# Patient Record
Sex: MALE | Race: WHITE
[De-identification: names, ages, dates, MRNs, and addresses within clinical notes are randomized per-mention and may not be internally consistent; named-entity substitution may affect disease eponyms.]

---

## 2018-06-04 ENCOUNTER — HOSPITAL ENCOUNTER (INPATIENT)
Dept: HOSPITAL 47 - EC | Age: 83
LOS: 4 days | Discharge: HOME | DRG: 175 | End: 2018-06-08
Payer: MEDICARE

## 2018-06-04 VITALS — BODY MASS INDEX: 27.7 KG/M2

## 2018-06-04 DIAGNOSIS — J96.21: ICD-10-CM

## 2018-06-04 DIAGNOSIS — I26.99: Primary | ICD-10-CM

## 2018-06-04 DIAGNOSIS — I50.32: ICD-10-CM

## 2018-06-04 DIAGNOSIS — D72.819: ICD-10-CM

## 2018-06-04 DIAGNOSIS — Z79.899: ICD-10-CM

## 2018-06-04 DIAGNOSIS — E11.22: ICD-10-CM

## 2018-06-04 DIAGNOSIS — Z99.81: ICD-10-CM

## 2018-06-04 DIAGNOSIS — I13.0: ICD-10-CM

## 2018-06-04 DIAGNOSIS — I25.2: ICD-10-CM

## 2018-06-04 DIAGNOSIS — Z79.1: ICD-10-CM

## 2018-06-04 DIAGNOSIS — N17.9: ICD-10-CM

## 2018-06-04 DIAGNOSIS — J44.1: ICD-10-CM

## 2018-06-04 DIAGNOSIS — N18.3: ICD-10-CM

## 2018-06-04 DIAGNOSIS — D64.9: ICD-10-CM

## 2018-06-04 DIAGNOSIS — Z85.46: ICD-10-CM

## 2018-06-04 DIAGNOSIS — T50.2X5A: ICD-10-CM

## 2018-06-04 DIAGNOSIS — I27.20: ICD-10-CM

## 2018-06-04 DIAGNOSIS — Z92.3: ICD-10-CM

## 2018-06-04 DIAGNOSIS — E78.5: ICD-10-CM

## 2018-06-04 DIAGNOSIS — K21.9: ICD-10-CM

## 2018-06-04 DIAGNOSIS — I25.10: ICD-10-CM

## 2018-06-04 DIAGNOSIS — Z79.51: ICD-10-CM

## 2018-06-04 DIAGNOSIS — Z87.891: ICD-10-CM

## 2018-06-04 DIAGNOSIS — G62.82: ICD-10-CM

## 2018-06-04 DIAGNOSIS — Z66: ICD-10-CM

## 2018-06-04 DIAGNOSIS — Z79.84: ICD-10-CM

## 2018-06-04 LAB
ALBUMIN SERPL-MCNC: 3.5 G/DL (ref 3.5–5)
ALP SERPL-CCNC: 61 U/L (ref 38–126)
ALT SERPL-CCNC: 22 U/L (ref 21–72)
ANION GAP SERPL CALC-SCNC: 13 MMOL/L
APTT BLD: 26.7 SEC (ref 22–30)
AST SERPL-CCNC: 19 U/L (ref 17–59)
BASOPHILS # BLD MANUAL: 0.09 K/UL (ref 0–0.2)
BUN SERPL-SCNC: 59 MG/DL (ref 9–20)
CALCIUM SPEC-MCNC: 8.6 MG/DL (ref 8.4–10.2)
CELLS COUNTED: 100
CHLORIDE SERPL-SCNC: 97 MMOL/L (ref 98–107)
CK SERPL-CCNC: 46 U/L (ref 55–170)
CO2 SERPL-SCNC: 31 MMOL/L (ref 22–30)
D DIMER PPP FEU-MCNC: 2.2 MG/L FEU (ref ?–0.6)
EOSINOPHIL # BLD MANUAL: 0.09 K/UL (ref 0–0.7)
ERYTHROCYTE [DISTWIDTH] IN BLOOD BY AUTOMATED COUNT: 3.16 M/UL (ref 4.3–5.9)
ERYTHROCYTE [DISTWIDTH] IN BLOOD: 16.7 % (ref 11.5–15.5)
GLUCOSE BLD-MCNC: 190 MG/DL (ref 75–99)
GLUCOSE SERPL-MCNC: 157 MG/DL (ref 74–99)
HCT VFR BLD AUTO: 29.4 % (ref 39–53)
HGB BLD-MCNC: 9.6 GM/DL (ref 13–17.5)
INR PPP: 1.2 (ref ?–1.2)
LYMPHOCYTES # BLD MANUAL: 0.68 K/UL (ref 1–4.8)
MCH RBC QN AUTO: 30.4 PG (ref 25–35)
MCHC RBC AUTO-ENTMCNC: 32.7 G/DL (ref 31–37)
MCV RBC AUTO: 93 FL (ref 80–100)
MONOCYTES # BLD MANUAL: 0.5 K/UL (ref 0–1)
NEUTROPHILS NFR BLD MANUAL: 55 %
NEUTS SEG # BLD MANUAL: 1.7 K/UL (ref 1.3–7.7)
PH UR: 5 [PH] (ref 5–8)
PLATELET # BLD AUTO: 278 K/UL (ref 150–450)
POTASSIUM SERPL-SCNC: 3.8 MMOL/L (ref 3.5–5.1)
PROT SERPL-MCNC: 6 G/DL (ref 6.3–8.2)
PT BLD: 11.4 SEC (ref 9–12)
SODIUM SERPL-SCNC: 141 MMOL/L (ref 137–145)
SP GR UR: 1.01 (ref 1–1.03)
TROPONIN I SERPL-MCNC: <0.012 NG/ML (ref 0–0.03)
UROBILINOGEN UR QL STRIP: <2 MG/DL (ref ?–2)
WBC # BLD AUTO: 3.1 K/UL (ref 3.8–10.6)

## 2018-06-04 PROCEDURE — 80053 COMPREHEN METABOLIC PANEL: CPT

## 2018-06-04 PROCEDURE — 80198 ASSAY OF THEOPHYLLINE: CPT

## 2018-06-04 PROCEDURE — 99285 EMERGENCY DEPT VISIT HI MDM: CPT

## 2018-06-04 PROCEDURE — 83880 ASSAY OF NATRIURETIC PEPTIDE: CPT

## 2018-06-04 PROCEDURE — 85379 FIBRIN DEGRADATION QUANT: CPT

## 2018-06-04 PROCEDURE — 85610 PROTHROMBIN TIME: CPT

## 2018-06-04 PROCEDURE — 82550 ASSAY OF CK (CPK): CPT

## 2018-06-04 PROCEDURE — 87040 BLOOD CULTURE FOR BACTERIA: CPT

## 2018-06-04 PROCEDURE — 93970 EXTREMITY STUDY: CPT

## 2018-06-04 PROCEDURE — 36415 COLL VENOUS BLD VENIPUNCTURE: CPT

## 2018-06-04 PROCEDURE — 82553 CREATINE MB FRACTION: CPT

## 2018-06-04 PROCEDURE — 71045 X-RAY EXAM CHEST 1 VIEW: CPT

## 2018-06-04 PROCEDURE — 83036 HEMOGLOBIN GLYCOSYLATED A1C: CPT

## 2018-06-04 PROCEDURE — 81003 URINALYSIS AUTO W/O SCOPE: CPT

## 2018-06-04 PROCEDURE — 94640 AIRWAY INHALATION TREATMENT: CPT

## 2018-06-04 PROCEDURE — 71046 X-RAY EXAM CHEST 2 VIEWS: CPT

## 2018-06-04 PROCEDURE — 96365 THER/PROPH/DIAG IV INF INIT: CPT

## 2018-06-04 PROCEDURE — 83735 ASSAY OF MAGNESIUM: CPT

## 2018-06-04 PROCEDURE — 85730 THROMBOPLASTIN TIME PARTIAL: CPT

## 2018-06-04 PROCEDURE — 96376 TX/PRO/DX INJ SAME DRUG ADON: CPT

## 2018-06-04 PROCEDURE — 84484 ASSAY OF TROPONIN QUANT: CPT

## 2018-06-04 PROCEDURE — 93005 ELECTROCARDIOGRAM TRACING: CPT

## 2018-06-04 PROCEDURE — 80048 BASIC METABOLIC PNL TOTAL CA: CPT

## 2018-06-04 PROCEDURE — 78582 LUNG VENTILAT&PERFUS IMAGING: CPT

## 2018-06-04 PROCEDURE — 93306 TTE W/DOPPLER COMPLETE: CPT

## 2018-06-04 PROCEDURE — 96375 TX/PRO/DX INJ NEW DRUG ADDON: CPT

## 2018-06-04 PROCEDURE — 85025 COMPLETE CBC W/AUTO DIFF WBC: CPT

## 2018-06-04 RX ADMIN — NAPROXEN SCH MG: 250 TABLET ORAL at 21:04

## 2018-06-04 RX ADMIN — INSULIN ASPART SCH UNIT: 100 INJECTION, SOLUTION INTRAVENOUS; SUBCUTANEOUS at 21:07

## 2018-06-04 RX ADMIN — SODIUM CHLORIDE SCH MLS/HR: 450 INJECTION, SOLUTION INTRAVENOUS at 18:55

## 2018-06-04 RX ADMIN — FUROSEMIDE SCH MG: 10 INJECTION, SOLUTION INTRAMUSCULAR; INTRAVENOUS at 21:02

## 2018-06-04 RX ADMIN — GLIMEPIRIDE SCH MG: 2 TABLET ORAL at 21:03

## 2018-06-04 RX ADMIN — IPRATROPIUM BROMIDE AND ALBUTEROL SULFATE SCH: .5; 3 SOLUTION RESPIRATORY (INHALATION) at 21:06

## 2018-06-04 RX ADMIN — NITROGLYCERIN SCH: 20 OINTMENT TOPICAL at 21:07

## 2018-06-04 RX ADMIN — METHYLPREDNISOLONE SODIUM SUCCINATE SCH MG: 125 INJECTION, POWDER, FOR SOLUTION INTRAMUSCULAR; INTRAVENOUS at 21:06

## 2018-06-04 NOTE — ED
SOB HPI





- General


Chief Complaint: Shortness of Breath


Stated Complaint: CHF


Time Seen by Provider: 06/04/18 17:03


Source: patient


Mode of arrival: wheelchair


Limitations: physical limitation





- History of Present Illness


Initial Comments: 





This 84-year-old white male presents with a complaint of some shortness of 

breath.  He states that this is been present since August 2017 but seems to be 

worse over the last 2 months.  He does complain of some lower extremity edema 

and pain.  He relates that this is fairly chronic as well.  He denies any 

history of DVT or PE but does have a history of lower extremity 

atherosclerosis.  He also followed up some prostate cancer this past year and 

had 27 radiation treatments to his prostate.  He apparently has not been doing 

very well ever since.  The wife relates that he also has had some chills but no 

fevers.  He has had occasional shaking and jerking of his extremities.  He 

denies any actual chest pain.  He was seen at his primary care physician's 

office today and was sent to the ER for further evaluation, treatment, and 

admission.  He does wear home oxygen for chronic COPD.  He does have a history 

of congestive heart failure as well.  He denies any other pulmonary problems.  

No other complaints or modifying factors.  He apparently does have an 

appointment this next week to see Dr. Quan from pulmonology.  He does 

complain of significant weakness.





- Related Data


 Home Medications











 Medication  Instructions  Recorded  Confirmed


 


Atenolol [Tenormin] 50 mg PO DAILY 06/04/18 06/04/18


 


Atorvastatin [Lipitor] 20 mg PO DAILY 06/04/18 06/04/18


 


Fexofenadine HCl [Allegra Allergy] 180 mg PO DAILY 06/04/18 06/04/18


 


Fluticasone/Umeclidin/Vilanter 1 puff INHALATION RT-DAILY 06/04/18 06/04/18





[Trelegy Ellipta 100-62.5-25]   


 


Furosemide [Lasix] 20 mg PO DAILY 06/04/18 06/04/18


 


Glimepiride [Amaryl] 2 mg PO BID 06/04/18 06/04/18


 


Montelukast [Singulair] 10 mg PO DAILY 06/04/18 06/04/18


 


Naproxen 500 mg PO BID 06/04/18 06/04/18


 


Omeprazole 20 mg PO DAILY 06/04/18 06/04/18


 


Potassium Chloride ER [K-Dur 10] 10 meq PO DAILY 06/04/18 06/04/18


 


Tamsulosin [Flomax] 0.4 mg PO DAILY 06/04/18 06/04/18


 


Theophylline 24 Hour [Matt-24] 300 mg PO DAILY 06/04/18 06/04/18


 


Vit C/E/Zn/Coppr/Lutein/Zeaxan 1 cap PO DAILY 06/04/18 06/04/18





[Preservision Areds 2 Softgel]   


 


Vitamin B Complex 1 cap PO DAILY 06/04/18 06/04/18


 


sitaGLIPtin PHOS/metFORMIN HCL 1 tab PO DAILY 06/04/18 06/04/18





[Janumet Xr 100-1,000 mg Tablet]   











 Allergies











Allergy/AdvReac Type Severity Reaction Status Date / Time


 


No Known Allergies Allergy   Verified 06/04/18 17:18














Review of Systems


ROS Statement: 


Those systems with pertinent positive or pertinent negative responses have been 

documented in the HPI.





ROS Other: All systems not noted in ROS Statement are negative.





Past Medical History


Past Medical History: Heart Failure, COPD, Diabetes Mellitus, GERD/Reflux, 

Hyperlipidemia, Hypertension, Myocardial Infarction (MI)


Additional Past Medical History / Comment(s): prostate ca


History of Any Multi-Drug Resistant Organisms: None Reported


Past Surgical History: No Surgical Hx Reported


Past Psychological History: No Psychological Hx Reported


Smoking Status: Former smoker


Past Alcohol Use History: None Reported


Past Drug Use History: None Reported





General Exam





- General Exam Comments


Initial Comments: 





GENERAL: The patient is well nourished and well hydrated. 


VITAL SIGNS: Heart rate, blood pressure, respiratory rate reviewed as recorded 

in nurse's notes. 


EYES: Pupils are round and reactive. Extraocular movements are intact. No 

conjunctival / lid redness or swelling. 


ENT: No external evidence of injury, swelling, or ecchymosis. Airway is patent. 

Throat is clear. 


NECK: Nontender. No swelling or evidence of injury. No subcutaneous emphysema. 

Trachea is midline. No thyroid mass. 


HEART: Regular rate and rhythm. Good peripheral pulses. 


LUNGS/CHEST: Wheezing is noted to bilateral chest.  No ecchymosis, subcutaneous 

emphysema, or tenderness. 


ABDOMEN: Abdomen soft without tenderness. No palpable masses or organomegaly. 

No peritoneal signs. No abdominal wall swelling or ecchymosis. 


EXTREMITIES: There is bilateral leg tenderness and significant edema.  Normal 

muscle tone and function. No thoracolumbar tenderness. 


NEUROLOGIC: Sensation is grossly intact. Cranial nerve exam reveals face is 

symmetrical, tongue is midline, speech is clear. 


SKIN: No abrasions or ecchymosis is noted. No induration or masses noted. 


PSYCHIATRIC: Alert and oriented. Appropriate behavior and judgment.





Limitations: physical limitation





Course


 Vital Signs











  06/04/18 06/04/18 06/04/18





  16:51 17:54 18:05


 


Temperature 99.4 F  


 


Pulse Rate 78 80 76


 


Respiratory 22  





Rate   


 


Blood Pressure 145/63  


 


O2 Sat by Pulse 90 L  





Oximetry   














  06/04/18 06/04/18





  18:10 19:00


 


Temperature  


 


Pulse Rate 78 85


 


Respiratory 20 18





Rate  


 


Blood Pressure 145/68 118/59


 


O2 Sat by Pulse 98 93 L





Oximetry  














Medical Decision Making





- Medical Decision Making





The patient was seen and examined.  All diagnostics were reviewed.  The patient 

had a EKG done which shows a normal sinus rhythm at a rate of 72.  There is no 

acute ST-T wave changes noted.  The AR intervals 156, QRS duration is 90, and 

the QTC intervals 435.  An IV is established and he receives some nitroglycerin 

paste as well as an aspirin.  He receives 80 of Lasix intravenously as well as 

125 modems of Solu-Medrol.  The laboratory does show evidence of acute kidney 

injury with no previous labs to compare.  The patient also is anemic and does 

have a leukopenia.  There is elevation of the d-dimer.  The chest x-ray is 

reviewed.  This does show the possibility of pulmonary edema.  The lower 

extremity venous Doppler does not show any evidence of DVT.  The kidney 

function is elevated and it is not felt as though the patient could have a CTA 

performed due to this.  He is started on some heparin.  It is felt as though he 

does have a degree of congestive heart failure causing his symptomatology.  He 

may need a V/Q scan tomorrow.  The patient is admitted to the hospital for 

further treatment.  The case is discussed with internal medicine.





- Lab Data


Result diagrams: 


 06/04/18 17:27





 06/04/18 17:27


 Lab Results











  06/04/18 06/04/18 06/04/18 Range/Units





  17:27 17:27 17:27 


 


WBC   3.1 L   (3.8-10.6)  k/uL


 


RBC   3.16 L   (4.30-5.90)  m/uL


 


Hgb   9.6 L   (13.0-17.5)  gm/dL


 


Hct   29.4 L   (39.0-53.0)  %


 


MCV   93.0   (80.0-100.0)  fL


 


MCH   30.4   (25.0-35.0)  pg


 


MCHC   32.7   (31.0-37.0)  g/dL


 


RDW   16.7 H   (11.5-15.5)  %


 


Plt Count   278   (150-450)  k/uL


 


Neutrophils % (Manual)   55   %


 


Band Neutrophils %   1   %


 


Lymphocytes % (Manual)   22   %


 


Monocytes % (Manual)   16   %


 


Eosinophils % (Manual)   3   %


 


Basophils % (Manual)   3   %


 


Neutrophils # (Manual)   1.70   (1.3-7.7)  k/uL


 


Lymphocytes # (Manual)   0.68 L   (1.0-4.8)  k/uL


 


Monocytes # (Manual)   0.50   (0-1.0)  k/uL


 


Eosinophils # (Manual)   0.09   (0-0.7)  k/uL


 


Basophils # (Manual)   0.09   (0-0.2)  k/uL


 


Nucleated RBCs   0   (0-0)  /100 WBC


 


Poikilocytosis   Slight   


 


Poikilocytosis (manual   Present   


 


Anisocytosis   Slight   


 


PT     (9.0-12.0)  sec


 


INR     (<1.2)  


 


APTT     (22.0-30.0)  sec


 


D-Dimer     (<0.60)  mg/L FEU


 


Sodium    141  (137-145)  mmol/L


 


Potassium    3.8  (3.5-5.1)  mmol/L


 


Chloride    97 L  ()  mmol/L


 


Carbon Dioxide    31 H  (22-30)  mmol/L


 


Anion Gap    13  mmol/L


 


BUN    59 H  (9-20)  mg/dL


 


Creatinine    1.82 H  (0.66-1.25)  mg/dL


 


Est GFR (CKD-EPI)AfAm    39  (>60 ml/min/1.73 sqM)  


 


Est GFR (CKD-EPI)NonAf    33  (>60 ml/min/1.73 sqM)  


 


Glucose    157 H  (74-99)  mg/dL


 


Calcium    8.6  (8.4-10.2)  mg/dL


 


Total Bilirubin    0.7  (0.2-1.3)  mg/dL


 


AST    19  (17-59)  U/L


 


ALT    22  (21-72)  U/L


 


Alkaline Phosphatase    61  ()  U/L


 


Total Creatine Kinase  46 L    ()  U/L


 


CK-MB (CK-2)  1.0    (0.0-2.4)  ng/mL


 


CK-MB (CK-2) Rel Index  2.2    


 


Troponin I  <0.012    (0.000-0.034)  ng/mL


 


NT-Pro-B Natriuret Pep     pg/mL


 


Total Protein    6.0 L  (6.3-8.2)  g/dL


 


Albumin    3.5  (3.5-5.0)  g/dL


 


Urine Color     


 


Urine Appearance     (Clear)  


 


Urine pH     (5.0-8.0)  


 


Ur Specific Gravity     (1.001-1.035)  


 


Urine Protein     (Negative)  


 


Urine Glucose (UA)     (Negative)  


 


Urine Ketones     (Negative)  


 


Urine Blood     (Negative)  


 


Urine Nitrite     (Negative)  


 


Urine Bilirubin     (Negative)  


 


Urine Urobilinogen     (<2.0)  mg/dL


 


Ur Leukocyte Esterase     (Negative)  


 


Theophylline     ug/mL














  06/04/18 06/04/18 06/04/18 Range/Units





  17:27 17:27 17:27 


 


WBC     (3.8-10.6)  k/uL


 


RBC     (4.30-5.90)  m/uL


 


Hgb     (13.0-17.5)  gm/dL


 


Hct     (39.0-53.0)  %


 


MCV     (80.0-100.0)  fL


 


MCH     (25.0-35.0)  pg


 


MCHC     (31.0-37.0)  g/dL


 


RDW     (11.5-15.5)  %


 


Plt Count     (150-450)  k/uL


 


Neutrophils % (Manual)     %


 


Band Neutrophils %     %


 


Lymphocytes % (Manual)     %


 


Monocytes % (Manual)     %


 


Eosinophils % (Manual)     %


 


Basophils % (Manual)     %


 


Neutrophils # (Manual)     (1.3-7.7)  k/uL


 


Lymphocytes # (Manual)     (1.0-4.8)  k/uL


 


Monocytes # (Manual)     (0-1.0)  k/uL


 


Eosinophils # (Manual)     (0-0.7)  k/uL


 


Basophils # (Manual)     (0-0.2)  k/uL


 


Nucleated RBCs     (0-0)  /100 WBC


 


Poikilocytosis     


 


Poikilocytosis (manual     


 


Anisocytosis     


 


PT  11.4    (9.0-12.0)  sec


 


INR  1.2 H    (<1.2)  


 


APTT  26.7    (22.0-30.0)  sec


 


D-Dimer  2.20 H    (<0.60)  mg/L FEU


 


Sodium     (137-145)  mmol/L


 


Potassium     (3.5-5.1)  mmol/L


 


Chloride     ()  mmol/L


 


Carbon Dioxide     (22-30)  mmol/L


 


Anion Gap     mmol/L


 


BUN     (9-20)  mg/dL


 


Creatinine     (0.66-1.25)  mg/dL


 


Est GFR (CKD-EPI)AfAm     (>60 ml/min/1.73 sqM)  


 


Est GFR (CKD-EPI)NonAf     (>60 ml/min/1.73 sqM)  


 


Glucose     (74-99)  mg/dL


 


Calcium     (8.4-10.2)  mg/dL


 


Total Bilirubin     (0.2-1.3)  mg/dL


 


AST     (17-59)  U/L


 


ALT     (21-72)  U/L


 


Alkaline Phosphatase     ()  U/L


 


Total Creatine Kinase     ()  U/L


 


CK-MB (CK-2)     (0.0-2.4)  ng/mL


 


CK-MB (CK-2) Rel Index     


 


Troponin I     (0.000-0.034)  ng/mL


 


NT-Pro-B Natriuret Pep   1050   pg/mL


 


Total Protein     (6.3-8.2)  g/dL


 


Albumin     (3.5-5.0)  g/dL


 


Urine Color     


 


Urine Appearance     (Clear)  


 


Urine pH     (5.0-8.0)  


 


Ur Specific Gravity     (1.001-1.035)  


 


Urine Protein     (Negative)  


 


Urine Glucose (UA)     (Negative)  


 


Urine Ketones     (Negative)  


 


Urine Blood     (Negative)  


 


Urine Nitrite     (Negative)  


 


Urine Bilirubin     (Negative)  


 


Urine Urobilinogen     (<2.0)  mg/dL


 


Ur Leukocyte Esterase     (Negative)  


 


Theophylline    8.7  ug/mL














  06/04/18 Range/Units





  18:58 


 


WBC   (3.8-10.6)  k/uL


 


RBC   (4.30-5.90)  m/uL


 


Hgb   (13.0-17.5)  gm/dL


 


Hct   (39.0-53.0)  %


 


MCV   (80.0-100.0)  fL


 


MCH   (25.0-35.0)  pg


 


MCHC   (31.0-37.0)  g/dL


 


RDW   (11.5-15.5)  %


 


Plt Count   (150-450)  k/uL


 


Neutrophils % (Manual)   %


 


Band Neutrophils %   %


 


Lymphocytes % (Manual)   %


 


Monocytes % (Manual)   %


 


Eosinophils % (Manual)   %


 


Basophils % (Manual)   %


 


Neutrophils # (Manual)   (1.3-7.7)  k/uL


 


Lymphocytes # (Manual)   (1.0-4.8)  k/uL


 


Monocytes # (Manual)   (0-1.0)  k/uL


 


Eosinophils # (Manual)   (0-0.7)  k/uL


 


Basophils # (Manual)   (0-0.2)  k/uL


 


Nucleated RBCs   (0-0)  /100 WBC


 


Poikilocytosis   


 


Poikilocytosis (manual   


 


Anisocytosis   


 


PT   (9.0-12.0)  sec


 


INR   (<1.2)  


 


APTT   (22.0-30.0)  sec


 


D-Dimer   (<0.60)  mg/L FEU


 


Sodium   (137-145)  mmol/L


 


Potassium   (3.5-5.1)  mmol/L


 


Chloride   ()  mmol/L


 


Carbon Dioxide   (22-30)  mmol/L


 


Anion Gap   mmol/L


 


BUN   (9-20)  mg/dL


 


Creatinine   (0.66-1.25)  mg/dL


 


Est GFR (CKD-EPI)AfAm   (>60 ml/min/1.73 sqM)  


 


Est GFR (CKD-EPI)NonAf   (>60 ml/min/1.73 sqM)  


 


Glucose   (74-99)  mg/dL


 


Calcium   (8.4-10.2)  mg/dL


 


Total Bilirubin   (0.2-1.3)  mg/dL


 


AST   (17-59)  U/L


 


ALT   (21-72)  U/L


 


Alkaline Phosphatase   ()  U/L


 


Total Creatine Kinase   ()  U/L


 


CK-MB (CK-2)   (0.0-2.4)  ng/mL


 


CK-MB (CK-2) Rel Index   


 


Troponin I   (0.000-0.034)  ng/mL


 


NT-Pro-B Natriuret Pep   pg/mL


 


Total Protein   (6.3-8.2)  g/dL


 


Albumin   (3.5-5.0)  g/dL


 


Urine Color  Light Yellow  


 


Urine Appearance  Clear  (Clear)  


 


Urine pH  5.0  (5.0-8.0)  


 


Ur Specific Gravity  1.008  (1.001-1.035)  


 


Urine Protein  Negative  (Negative)  


 


Urine Glucose (UA)  Negative  (Negative)  


 


Urine Ketones  Negative  (Negative)  


 


Urine Blood  Negative  (Negative)  


 


Urine Nitrite  Negative  (Negative)  


 


Urine Bilirubin  Negative  (Negative)  


 


Urine Urobilinogen  <2.0  (<2.0)  mg/dL


 


Ur Leukocyte Esterase  Negative  (Negative)  


 


Theophylline   ug/mL














Disposition


Clinical Impression: 


 Dyspnea, Hypoxia, Leg pain, Anemia, Elevated d-dimer, Hypertension, NOEMI (acute 

kidney injury), CHF (congestive heart failure), Pulmonary edema





Disposition: ADMITTED AS IP TO THIS HOSP


Condition: Fair


Is patient prescribed a controlled substance at d/c from ED?: No


Time of Disposition: 19:35

## 2018-06-04 NOTE — XR
EXAMINATION: XR chest 2V

DATE AND TIME:  6/4/2018 6:12 PM

 

ORDERING PROVIDER: Fadi Huggins DO

 

CLINICAL INDICATION: difficulty breathing

 

TECHNIQUE: PA and lateral

 

COMPARISON: None.

 

DESCRIPTION: 

There is mild silhouetting of the pulmonary vasculature bilaterally likely fine reticular pattern of 
increased density, suggesting mild interstitial phase pulmonary edema.

 

There are a few scattered small bands of added opacities consistent with subsegmental atelectasis.

 

The pleural spaces are negative.

 

The cardiac silhouette is mildly enlarged. 

 

The mediastinal and pleural silhouettes are unremarkable. 

 

The skeletal structures are intact without focal findings. 

 

The soft tissues are unremarkable.

 

IMPRESSION:

Findings which can correlate with a clinical diagnosis of mild interstitial phase cardiogenic pulmona
ry edema.

## 2018-06-04 NOTE — US
EXAMINATION TYPE: US venous doppler duplex LE 

 

DATE OF EXAM: 6/4/2018 6:37 PM

 

COMPARISON: NONE

 

CLINICAL HISTORY: Pain. Swelling

 

SIDE PERFORMED: Bilateral  

 

TECHNIQUE:  The lower extremity deep venous system is examined utilizing real time linear array sonog
chris with graded compression, doppler sonography and color-flow sonography.

 

VESSELS IMAGED:

External Iliac Vein (EIV)

Common Femoral Vein

Deep Femoral Vein

Greater Saphenous Vein *

Femoral Vein

Popliteal Vein

Small Saphenous Vein *

Proximal Calf Veins

(* superficial vessels)

 

FINDINGS:  Grayscale, color doppler, spectral doppler imaging performed of the deep veins of the lowe
r extremities.  There is normal flow, compressibility, vascular waveforms.

 

IMPRESSION: NEGATIVE FOR DVT, BILATERAL LOWER EXTREMITIES.

## 2018-06-05 LAB
BASOPHILS # BLD AUTO: 0 K/UL (ref 0–0.2)
BASOPHILS NFR BLD AUTO: 1 %
EOSINOPHIL # BLD AUTO: 0 K/UL (ref 0–0.7)
EOSINOPHIL NFR BLD AUTO: 0 %
ERYTHROCYTE [DISTWIDTH] IN BLOOD BY AUTOMATED COUNT: 3.24 M/UL (ref 4.3–5.9)
ERYTHROCYTE [DISTWIDTH] IN BLOOD: 16.6 % (ref 11.5–15.5)
GLUCOSE BLD-MCNC: 307 MG/DL (ref 75–99)
GLUCOSE BLD-MCNC: 310 MG/DL (ref 75–99)
GLUCOSE BLD-MCNC: 336 MG/DL (ref 75–99)
GLUCOSE BLD-MCNC: 352 MG/DL (ref 75–99)
HBA1C MFR BLD: 6.9 % (ref 4–6)
HCT VFR BLD AUTO: 30.6 % (ref 39–53)
HGB BLD-MCNC: 9.9 GM/DL (ref 13–17.5)
LYMPHOCYTES # SPEC AUTO: 0.4 K/UL (ref 1–4.8)
LYMPHOCYTES NFR SPEC AUTO: 13 %
MCH RBC QN AUTO: 30.6 PG (ref 25–35)
MCHC RBC AUTO-ENTMCNC: 32.3 G/DL (ref 31–37)
MCV RBC AUTO: 94.5 FL (ref 80–100)
MONOCYTES # BLD AUTO: 0.1 K/UL (ref 0–1)
MONOCYTES NFR BLD AUTO: 2 %
NEUTROPHILS # BLD AUTO: 2.4 K/UL (ref 1.3–7.7)
NEUTROPHILS NFR BLD AUTO: 83 %
PLATELET # BLD AUTO: 270 K/UL (ref 150–450)
TROPONIN I SERPL-MCNC: 0.01 NG/ML (ref 0–0.03)
TROPONIN I SERPL-MCNC: <0.012 NG/ML (ref 0–0.03)
WBC # BLD AUTO: 2.9 K/UL (ref 3.8–10.6)

## 2018-06-05 RX ADMIN — POTASSIUM CHLORIDE SCH MEQ: 750 TABLET, EXTENDED RELEASE ORAL at 10:06

## 2018-06-05 RX ADMIN — ATENOLOL SCH MG: 50 TABLET ORAL at 10:06

## 2018-06-05 RX ADMIN — THEOPHYLLINE ANHYDROUS SCH MG: 300 CAPSULE, EXTENDED RELEASE ORAL at 10:06

## 2018-06-05 RX ADMIN — Medication SCH EACH: at 10:06

## 2018-06-05 RX ADMIN — NITROGLYCERIN SCH INCH: 20 OINTMENT TOPICAL at 09:59

## 2018-06-05 RX ADMIN — NITROGLYCERIN SCH INCH: 20 OINTMENT TOPICAL at 17:13

## 2018-06-05 RX ADMIN — ATORVASTATIN CALCIUM SCH MG: 20 TABLET, FILM COATED ORAL at 10:06

## 2018-06-05 RX ADMIN — NAPROXEN SCH MG: 250 TABLET ORAL at 21:25

## 2018-06-05 RX ADMIN — LORATADINE SCH MG: 10 TABLET ORAL at 10:06

## 2018-06-05 RX ADMIN — NAPROXEN SCH MG: 250 TABLET ORAL at 09:59

## 2018-06-05 RX ADMIN — IPRATROPIUM BROMIDE AND ALBUTEROL SULFATE SCH: .5; 3 SOLUTION RESPIRATORY (INHALATION) at 01:27

## 2018-06-05 RX ADMIN — TAMSULOSIN HYDROCHLORIDE SCH MG: 0.4 CAPSULE ORAL at 10:06

## 2018-06-05 RX ADMIN — METHYLPREDNISOLONE SODIUM SUCCINATE SCH MG: 125 INJECTION, POWDER, FOR SOLUTION INTRAMUSCULAR; INTRAVENOUS at 17:13

## 2018-06-05 RX ADMIN — PANTOPRAZOLE SODIUM SCH MG: 40 TABLET, DELAYED RELEASE ORAL at 06:32

## 2018-06-05 RX ADMIN — INSULIN ASPART SCH UNIT: 100 INJECTION, SOLUTION INTRAVENOUS; SUBCUTANEOUS at 21:36

## 2018-06-05 RX ADMIN — METHYLPREDNISOLONE SODIUM SUCCINATE SCH MG: 125 INJECTION, POWDER, FOR SOLUTION INTRAMUSCULAR; INTRAVENOUS at 23:41

## 2018-06-05 RX ADMIN — NITROGLYCERIN SCH: 20 OINTMENT TOPICAL at 21:25

## 2018-06-05 RX ADMIN — FUROSEMIDE SCH MG: 10 INJECTION, SOLUTION INTRAMUSCULAR; INTRAVENOUS at 21:24

## 2018-06-05 RX ADMIN — BUDESONIDE AND FORMOTEROL FUMARATE DIHYDRATE SCH PUFF: 160; 4.5 AEROSOL RESPIRATORY (INHALATION) at 20:15

## 2018-06-05 RX ADMIN — INSULIN DETEMIR SCH UNIT: 100 INJECTION, SOLUTION SUBCUTANEOUS at 13:14

## 2018-06-05 RX ADMIN — IPRATROPIUM BROMIDE AND ALBUTEROL SULFATE SCH ML: .5; 3 SOLUTION RESPIRATORY (INHALATION) at 16:04

## 2018-06-05 RX ADMIN — I-VITE, TAB 1000-60-2MG (60/BT) SCH EACH: TAB at 10:06

## 2018-06-05 RX ADMIN — INSULIN ASPART SCH UNIT: 100 INJECTION, SOLUTION INTRAVENOUS; SUBCUTANEOUS at 17:12

## 2018-06-05 RX ADMIN — ASPIRIN 325 MG ORAL TABLET SCH MG: 325 PILL ORAL at 10:06

## 2018-06-05 RX ADMIN — FUROSEMIDE SCH MG: 10 INJECTION, SOLUTION INTRAMUSCULAR; INTRAVENOUS at 09:59

## 2018-06-05 RX ADMIN — METHYLPREDNISOLONE SODIUM SUCCINATE SCH MG: 125 INJECTION, POWDER, FOR SOLUTION INTRAMUSCULAR; INTRAVENOUS at 09:58

## 2018-06-05 RX ADMIN — METHYLPREDNISOLONE SODIUM SUCCINATE SCH MG: 125 INJECTION, POWDER, FOR SOLUTION INTRAMUSCULAR; INTRAVENOUS at 13:14

## 2018-06-05 RX ADMIN — LINAGLIPTIN SCH MG: 5 TABLET, FILM COATED ORAL at 10:06

## 2018-06-05 RX ADMIN — IPRATROPIUM BROMIDE AND ALBUTEROL SULFATE SCH ML: .5; 3 SOLUTION RESPIRATORY (INHALATION) at 11:27

## 2018-06-05 RX ADMIN — MONTELUKAST SODIUM SCH MG: 10 TABLET, FILM COATED ORAL at 10:06

## 2018-06-05 RX ADMIN — SODIUM CHLORIDE SCH MLS/HR: 450 INJECTION, SOLUTION INTRAVENOUS at 23:46

## 2018-06-05 RX ADMIN — GLIMEPIRIDE SCH MG: 2 TABLET ORAL at 21:25

## 2018-06-05 RX ADMIN — GLIMEPIRIDE SCH MG: 2 TABLET ORAL at 10:06

## 2018-06-05 RX ADMIN — INSULIN ASPART SCH UNIT: 100 INJECTION, SOLUTION INTRAVENOUS; SUBCUTANEOUS at 12:07

## 2018-06-05 RX ADMIN — IPRATROPIUM BROMIDE AND ALBUTEROL SULFATE SCH ML: .5; 3 SOLUTION RESPIRATORY (INHALATION) at 20:15

## 2018-06-05 RX ADMIN — SODIUM CHLORIDE SCH MLS/HR: 450 INJECTION, SOLUTION INTRAVENOUS at 10:43

## 2018-06-05 RX ADMIN — NITROGLYCERIN SCH INCH: 20 OINTMENT TOPICAL at 13:14

## 2018-06-05 RX ADMIN — IPRATROPIUM BROMIDE AND ALBUTEROL SULFATE SCH ML: .5; 3 SOLUTION RESPIRATORY (INHALATION) at 07:35

## 2018-06-05 RX ADMIN — INSULIN ASPART SCH UNIT: 100 INJECTION, SOLUTION INTRAVENOUS; SUBCUTANEOUS at 06:33

## 2018-06-05 NOTE — CONS
CONSULTATION



REASON FOR CONSULTATION:

Shortness of breath, possible heart failure.



Mr. Echavarria is a gentleman who is 84 years of age.  He came into the hospital with

complaints of increasing shortness of breath that has been going on for about a year,

but over the last 2 months it was getting progressively worse.  He has some lower

extremity edema, some pain, but the edema seems to be chronic.  He has also other

comorbid conditions, including prostate cancer, for which he has had previous

treatments.  His main complaint was shortness of breath.  He does have home oxygen.  He

has got significant COPD, but apparently progressively it has gotten worse over the

last couple of months, more so in the last 2 weeks.  He also has history of

hypertension, hyperlipidemia, prostate cancer, type 2 diabetes, hyperlipidemia, and he

takes oral agents for his diabetes.



He does not have documented evidence of any CVA.  There is a question of myocardial

infarction.  He does not have any major surgeries that he can tell me about.



ALLERGIES:

NONE.



MEDICATIONS:

1. Metolazone.

2. Flomax.

3. Theophylline.

4. Singulair.

5. Lasix 20 mg daily.

6. Naproxen.

7. Tenormin 50 mg daily.

8. Glimepiride 2 mg b.i.d.

9. Atorvastatin 20 mg daily.

10.Janumet; dose is unclear.



PHYSICAL EXAMINATION:

Blood pressure is 148/80. Pulse rate is about 90 per minute, regular.

HEENT: Unremarkable. Fundus was not examined by me.

NECK:  Supple. There is JVD of at least 1 to 1.5 cm above the sternal angle.  There is

no carotid bruit.

Heart exam reveals S1, S2 heard normally.  There is a short systolic murmur at left

sternal border and base.  Second heart sound is well preserved.

Lungs reveal bilateral diminished air entry.

Abdomen is distended, nontender.

Lower extremities reveal bilateral mild edema with diminished pulses.

Central nervous system is grossly within normal limits.



EKG revealed a sinus mechanism with poor R-wave progression in leads V1 to V3 and

slightly leftward axis but no acute changes.



LABORATORY DATA:

BNP of 1050, which is technically normal for this patient.  His troponin levels are

normal.  Liver functions are within normal limits.  BUN and creatinine are elevated,

suggestive of chronic kidney disease secondary to diabetes.  D-dimer was elevated at

2.2.  Patient had a V/Q scan which revealed intermediate probability.  Dr. Maria has

seen him and advised him to continue heparin for the time being.



IMPRESSION:

1. Exacerbation of chronic obstructive pulmonary disease in a patient with known

    chronic oxygen-dependent chronic obstructive pulmonary disease.

2. I do not believe we are dealing with any systolic heart failure without any

    clinical evidence of heart failure and also normal BNP.

3. Probable significant pulmonary hypertension.

4. History of prostate cancer, for which he had radiation treatments.



RECOMMENDATIONS:

I would recommend that we obtain an echocardiogram to assess LV function, continue IV

heparin while the diagnosis of pulmonary embolism is still in flux.  His troponin

levels are normal, suggesting that there is no myocardial damage.  Based on clinical

findings, I will make further recommendations.  Appreciate Dr. Maria's input from a

pulmonary standpoint on this patient.  I do not believe we are dealing with pulmonary

embolism in a patient with a chronic lung disease.  Based on clinical course, I will

make further recommendations.



Thank you very much for the consult.





SUNIL / GABY: [id number] / Job#: [id number]

## 2018-06-05 NOTE — HP
HISTORY AND PHYSICAL



DATE OF SERVICE:

06/04/2018



CHIEF COMPLAINT:

Shortness of breath.



HISTORY OF PRESENT ILLNESS:

This 84 -year-old gentleman with a past medical history of CHF,  COPD,  diabetes type

2, GERD, hypertension, hyperlipidemia, being followed by Dr. Villela in the outpatient

setting was having shortness of breath and apparently  CHF in [place].  The patient

recently came to [place].  Patient admitted to [facility] a couple of times and

because of increased shortness of breath, the patient presented to Dr. Villela's office

today.  Dr. Villela discussed the case with me and sent the patient to the ER for

further evaluation and treatment.  There is no history of fever, rigors or chills.  No

history of headache, loss of consciousness, seizures.



PAST MEDICAL HISTORY:

1. COPD.

2. CHF.

3. Diabetes.

4. GERD.

5. Hypertension.

6. Hyperlipidemia.



MEDICATIONS:

Prior to admission include home medications are:

1. Vitamin B complex 1 p.o. daily.

2. Flomax 0.4 daily.

3. Vitamin 1 p.o. daily.

4. Matt-24 300 mg b.i.d.

5. Singular 10 mg p.o. daily.

6. K-Dur 10 mEq p.o. daily.

7. Lasix 20 mg b.i.d.

8. Allegra 180 mg p.o. daily.

9. Omeprazole 20 mg p.o. daily.

10.Naproxen 500 mg p.o. t.i.d.

11.Tenormin 50 mg p.o. daily.

12.Janumet 1 tab p.o. daily.

13.Amaryl 2 mg p.o. b.i.d.

14.Fluticasone Brilinta 1 puff daily.

15.Lipitor 20 mg p.o. daily.



ALLERGIES:

None.



FAMILY HISTORY:

No history of heart disease or strokes in the family.



SOCIAL HISTORY:

Previous history of smoking.  No history of current smoking or alcohol intake.



REVIEW OF SYSTEMS:

ENT: Diminished hearing and vision.

CARDIOVASCULAR: As mentioned earlier. Respiration:  Mentioned earlier.  GI no nausea or

vomiting. : No dysuria.  Nervous system: No numbness or weakness.

Allergy/Immunology: No asthma or hay fever.

MUSCULOSKELETAL: As mentioned earlier. Hematology/Oncology:  No history of anemia.

Endocrine: Diabetes mellitus.  Constitutional: As mentioned earlier.  Dermatology

negative.  Rheumatology negative.  Psychiatry as mentioned earlier.



PHYSICAL EXAMINATION:

Alert, oriented x3.  Pulse 92. Blood pressure 139/68, respiration 19, temperature 97.6,

pulse ox 94% on 3 L.

HEENT is conjunctivae normal.  Oral mucosa moist.

NECK:  Jugular venous distention at the root.  No carotid bruit.

CARDIOVASCULAR system:  S1, S2 muffled. No S3, no S4.

RESPIRATORY: Breath sounds diminished in the bases.  Bilateral scattered rhonchi and

crackles.

ABDOMEN:  Soft, nontender.  No mass.

LEGS:  Bilateral leg edema

NERVOUS SYSTEM: Higher functions as mentioned earlier.  Moves all 4 limbs.  No focal

motor deficits.

Lymphatics: No lymph nodes palpable in the neck, axillae or groin.

SKIN:  No ulcers, rashes or bleeding.



LAB STUDIES:

WBC 3.9, hemoglobin 9.6.  Otherwise, creatinine is 1.82.



ASSESSMENT:

1. Shortness of breath, possibly congestive heart failure acute exacerbation.

2. Chronic obstructive pulmonary disease acute exacerbation.

3. Increased creatinine with chronic kidney disease.

4. Elevated D-dimer.

5. History of diabetes type 2.

6. Gastroesophageal reflux disease.

7. Hypertension.

8. Hyperlipidemia.

9. Bilateral leg swelling.

10.History of myocardial infarction.

11.History of prostate cancer.

12.NO CODE, NO CPR, NO VENT.



RECOMMENDATIONS AND DISCUSSION:

In this 84-year-old gentleman who presented with multiple complex medical issues, we

will monitor the patient closely.  Continue the current medications.  Continue

symptomatic treatment.  Otherwise, we will initiate cautious diuresis and I would also

recommend IV heparin.  Cardiology consultation.  I would also recommend consultation

with pulmonary as well for COPD.  Otherwise repeat labs will be ordered.  Creatinine

will be monitored monitor close.  Home medications are reconciled.  The prognosis is

guarded because of multiple complex medical issues.  Further recommendations to follow.

A copy of dictation being forwarded to Dr. Villela who is the primary physician.





MMJUDY / BIRDN: [id number] / Job#: [id number]

## 2018-06-05 NOTE — ECHOF
Referral Reason:Heart Failure



MEASUREMENTS

--------

HEIGHT: 180.3 cm

WEIGHT: 84.8 kg

BP: 141/97

RVIDd:   3.4 cm     (< 3.3)

IVSd:   0.9 cm     (0.6 - 1.1)

LVIDd:   5.3 cm     (3.9 - 5.3)

LVPWd:   1.1 cm     (0.6 - 1.1)

IVSs:   1.5 cm

LVIDs:   2.7 cm

LVPWs:   2.1 cm

Ao Diam:   3.1 cm     (2.0 - 3.7)

LA Diam:   2.9 cm     (2.7 - 3.8)

MV EXCURSION:   16.009 mm     (> 18.000)

MV EF SLOPE:   76 mm/s     (70 - 150)

EPSS:   0.7 cm

MV E Arley:   1.10 m/s

MV DecT:   132 ms

MV A Arley:   1.43 m/s

MV E/A Ratio:   0.77 

RAP:   5.00 mmHg

RVSP:   54.59 mmHg







FINDINGS

--------

Sinus rhythm.

This was a technically difficult study with suboptimal views.

The left ventricular size is normal.   Left ventricular wall thickness is normal.   Overall left vent
ricular systolic function is normal with, an EF between 55 - 60 %.

The right ventricle is mildly enlarged.

The left atrium is normal in size.

The right atrium is normal in size.

The aortic valve was not well visualized.

The mitral valve leaflets are mildly thickened.   There is trace mitral regurgitation.

Mild tricuspid regurgitation present.   There is mild pulmonary hypertension.   The right ventricular
 systolic pressure, as measured by Doppler, is 54.59mmHg.

The pulmonic valve was not well visualized.   There is no pulmonic regurgitation present.

The aortic root, ascending aorta and aortic arch are normal.

Normal inferior vena cava with normal inspiratory collapse consistent with estimated right atrial pre
ssure of  5 mmHg.

There is no pericardial effusion.



CONCLUSIONS

--------

1. Sinus rhythm.

2. This was a technically difficult study with suboptimal views.

3. The left ventricular size is normal.

4. Left ventricular wall thickness is normal.

5. Overall left ventricular systolic function is normal with, an EF between 55 - 60 %.

6. The right ventricle is mildly enlarged.

7. The left atrium is normal in size.

8. The aortic valve was not well visualized.

9. The mitral valve leaflets are mildly thickened.

10. There is trace mitral regurgitation.

11. Mild tricuspid regurgitation present.

12. There is mild pulmonary hypertension.

13. There is no pulmonic regurgitation present.

14. The aortic root, ascending aorta and aortic arch are normal.

15. Normal inferior vena cava with normal inspiratory collapse consistent with estimated right atrial
 pressure of  5 mmHg.

16. There is no pericardial effusion.





SONOGRAPHER: Krystle Vee RDCS

## 2018-06-05 NOTE — P.CNPUL
History of Present Illness


Consult date: 06/05/18


Requesting physician: Amaury E Shahla


Reason for consult: dyspnea, COPD


Chief complaint: Shortness of breath, swelling of the lower extremities 


History of present illness: 





This is a very pleasant 84-year-old gentleman who follows with Dr. Villela as 

his primary care physician.  He has a history of hyperlipidemia, hypertension, 

coronary artery disease, congestive heart failure, diabetes mellitus, 

gastroesophageal reflux disease.  He also has a history of chronic obstructive 

pulmonary disease and was recently, in March 2018, started on home oxygen.  He 

was due to see Dr. Bridges in our office as a new patient this week.  He has been 

seen by a pulmonologist in [place] where he spends his lira.  He is on 

Trelegy, theophylline, Singulair, albuterol in the outpatient setting.  He is 

unsure of his pulmonary function numbers.  He does have a history of prostate 

cancer diagnosed in August 2017.  He had undergone 25 external radiation 

treatments and subsequently sent to [facility] for a 26 internal radiation with 

seed implants.  He states since that last treatment he has had lower extremity 

weakness and worsening shortness of breath.  He has been seen by a neurologist 

who states his lower extremity weakness is secondary to the radiation 

treatments.  Neuropathy.  Over the past several weeks he has also been having 

complaints of increasing swelling in the lower extremities and had been seen in 

[facility] treated with diuretics and released.  The swelling started 

again and he had been seen by Dr. Villela who at this time referred him here 

yesterday for further evaluation.  He is seen today in consultation on the 

selective care unit.  He is awake and alert in no acute distress.  He is 

dyspneic with minimal exertion and minimal conversation.  He states this is not 

new but has been getting progressively worse.  Troponins have been negative.  

ProBNP 1050.  Creatinine 1.82.  White count 2.9.  Hemoglobin 9.9.  D-dimer 

2.20.  Doppler studies of the lower extremities were negative for DVT 

bilaterally.  VQ scan revealed a moderate-sized effusion defect along the 

lateral right lung and a right basilar defect.  Read as intermediate 

probability for pulmonary embolism.  The patient is currently on a heparin 

drip.  He is also receiving Lasix 40 mg IV push every 12 hours.  He is 

currently maintaining good O2 saturations in the 90s on 3 L/m per nasal 

cannula.  He's been afebrile.  Hemodynamically stable.  Initiated on 

bronchodilators and IV Solu-Medrol. 





Review of Systems


Constitutional: Reports fatigue, Reports lethargy, Reports weight gain


Eyes: denies blurred vision, denies decreased vision


Ears: bilateral: decreased hearing


Ears, nose, mouth and throat: Denies headache, Denies sore throat


Cardiovascular: Reports dyspnea on exertion, Reports leg edema, Reports 

shortness of breath


Respiratory: Reports cough, Reports dyspnea, Reports home oxygen, Reports 

wheezing


Gastrointestinal: Denies abdominal pain, Denies diarrhea, Denies nausea, Denies 

vomiting


Genitourinary: Reports urinary hesitancy


Musculoskeletal: Reports muscle weakness, Reports shooting leg pain


Musculoskeletal: bilateral: ankle swelling


Integumentary: Denies pruritus, Denies rash


Neurological: Reports numbness, Reports weakness


Psychiatric: Denies anxiety, Denies depression


Endocrine: Denies fatigue, Denies weight change


Hematologic/Lymphatic: Reports as per HPI


Allergic/Immunologic: Reports as per HPI





Past Medical History


Past Medical History: Heart Failure, COPD, Diabetes Mellitus, GERD/Reflux, 

Hyperlipidemia, Hypertension, Myocardial Infarction (MI)


Additional Past Medical History / Comment(s): prostate ca


Last Myocardial Infarction Date:: unknown


History of Any Multi-Drug Resistant Organisms: None Reported


Past Surgical History: No Surgical Hx Reported, Orthopedic Surgery


Additional Past Surgical History / Comment(s): right rotator cuff sugery


Past Anesthesia/Blood Transfusion Reactions: No Reported Reaction


Past Psychological History: No Psychological Hx Reported


Smoking Status: Former smoker


Past Alcohol Use History: None Reported


Past Drug Use History: None Reported





Medications and Allergies


 Home Medications











 Medication  Instructions  Recorded  Confirmed  Type


 


Atenolol [Tenormin] 50 mg PO DAILY 06/04/18 06/04/18 History


 


Atorvastatin [Lipitor] 20 mg PO DAILY 06/04/18 06/04/18 History


 


Fexofenadine HCl [Allegra Allergy] 180 mg PO DAILY 06/04/18 06/04/18 History


 


Fluticasone/Umeclidin/Vilanter 1 puff INHALATION RT-DAILY 06/04/18 06/04/18 

History





[Trelegy Ellipta 100-62.5-25]    


 


Furosemide [Lasix] 20 mg PO DAILY 06/04/18 06/04/18 History


 


Glimepiride [Amaryl] 2 mg PO BID 06/04/18 06/04/18 History


 


Montelukast [Singulair] 10 mg PO DAILY 06/04/18 06/04/18 History


 


Naproxen 500 mg PO BID 06/04/18 06/04/18 History


 


Omeprazole 20 mg PO DAILY 06/04/18 06/04/18 History


 


Potassium Chloride ER [K-Dur 10] 10 meq PO DAILY 06/04/18 06/04/18 History


 


Tamsulosin [Flomax] 0.4 mg PO DAILY 06/04/18 06/04/18 History


 


Theophylline 24 Hour [Matt-24] 300 mg PO DAILY 06/04/18 06/04/18 History


 


Vit C/E/Zn/Coppr/Lutein/Zeaxan 1 cap PO DAILY 06/04/18 06/04/18 History





[Preservision Areds 2 Softgel]    


 


Vitamin B Complex 1 cap PO DAILY 06/04/18 06/04/18 History


 


sitaGLIPtin PHOS/metFORMIN HCL 1 tab PO DAILY 06/04/18 06/04/18 History





[Janumet Xr 100-1,000 mg Tablet]    


 


Cetirizine HCl [Zyrtec] 10 mg PO DAILY 06/05/18 06/05/18 History


 


Metolazone [Zaroxolyn] 5 mg PO DAILY 06/05/18 06/05/18 History











 Allergies











Allergy/AdvReac Type Severity Reaction Status Date / Time


 


No Known Allergies Allergy   Verified 06/04/18 17:18














Physical Exam


Vitals: 


 Vital Signs











  Temp Pulse Pulse Resp BP BP Pulse Ox


 


 06/05/18 07:50  98 F   96  18   135/67  94 L


 


 06/05/18 07:45   92     


 


 06/05/18 07:35   92     


 


 06/05/18 03:46    96  20   


 


 06/05/18 03:37  97.0 F L   96  20   141/97  94 L


 


 06/04/18 23:33    92  19   


 


 06/04/18 23:31  97.6 F   92  19   139/69  94 L


 


 06/04/18 20:15  98.3 F  92   18  135/58   93 L


 


 06/04/18 19:58  97.7 F   95  22   152/70  91 L


 


 06/04/18 19:00   85   18  118/59   93 L


 


 06/04/18 18:10   78   20  145/68   98


 


 06/04/18 18:05   76     


 


 06/04/18 17:54   80     


 


 06/04/18 16:51  99.4 F  78   22  145/63   90 L








 Intake and Output











 06/04/18 06/05/18 06/05/18





 22:59 06:59 14:59


 


Intake Total 162 159.362 


 


Output Total 200 1525 


 


Balance -38 -1365.638 


 


Intake:   


 


  Intake, IV Titration 62 159.362 





  Amount   


 


    Heparin Sodium,Porcine/ 62 159.362 





    D5w Pmx 25,000 unit In   





    Dextrose/Water 1 500ml.   





    bag @ 18 UNITS/KG/HR 31.   





    35 mls/hr IV .[id number] [facility]   





    Rx#:[id number]   


 


  Oral 100  


 


Output:   


 


  Urine 200 1525 


 


Other:   


 


  Voiding Method  Urinal 


 


  # Voids  1 


 


  Weight 85.2 kg  














- Constitutional


General appearance: average body habitus, mild distress





- EENT


Eyes: EOMI, PERRLA


ENT: hard of hearing


Ears: bilateral: normal





- Neck


Neck: normal ROM


Carotids: bilateral: upstroke normal


Thyroid: bilateral: normal size





- Respiratory


Respiratory: bilateral: diminished, wheezing





- Cardiovascular


Rhythm: regular


Heart sounds: normal: S1, S2





- Gastrointestinal


General gastrointestinal: normal bowel sounds, no organomegaly, soft, no 

tenderness





- Neurologic


Neurologic: CNII-XII intact





- Musculoskeletal


Musculoskeletal: generalized weakness





- Psychiatric


Psychiatric: A&O x's 3, appropriate affect, intact judgment & insight





Results





- Laboratory Findings


CBC and BMP: 


 06/05/18 05:40





 06/04/18 17:27


PT/INR, D-dimer











PT  11.4 sec (9.0-12.0)   06/04/18  17:27    


 


INR  1.2  (<1.2)  H  06/04/18  17:27    


 


D-Dimer  2.20 mg/L FEU (<0.60)  H  06/04/18  17:27    








Abnormal lab findings: 


 Abnormal Labs











  06/04/18 06/04/18 06/04/18





  17:27 17:27 17:27


 


WBC   3.1 L 


 


RBC   3.16 L 


 


Hgb   9.6 L 


 


Hct   29.4 L 


 


RDW   16.7 H 


 


Lymphocytes #   


 


Lymphocytes # (Manual)   0.68 L 


 


INR   


 


APTT   


 


D-Dimer   


 


Chloride    97 L


 


Carbon Dioxide    31 H


 


BUN    59 H


 


Creatinine    1.82 H


 


Glucose    157 H


 


POC Glucose (mg/dL)   


 


Total Creatine Kinase  46 L  


 


Total Protein    6.0 L














  06/04/18 06/04/18 06/04/18





  17:27 20:32 23:56


 


WBC   


 


RBC   


 


Hgb   


 


Hct   


 


RDW   


 


Lymphocytes #   


 


Lymphocytes # (Manual)   


 


INR  1.2 H  


 


APTT    60.1 H


 


D-Dimer  2.20 H  


 


Chloride   


 


Carbon Dioxide   


 


BUN   


 


Creatinine   


 


Glucose   


 


POC Glucose (mg/dL)   190 H 


 


Total Creatine Kinase   


 


Total Protein   














  06/05/18 06/05/18





  05:40 06:01


 


WBC  2.9 L 


 


RBC  3.24 L 


 


Hgb  9.9 L 


 


Hct  30.6 L 


 


RDW  16.6 H 


 


Lymphocytes #  0.4 L 


 


Lymphocytes # (Manual)  


 


INR  


 


APTT  


 


D-Dimer  


 


Chloride  


 


Carbon Dioxide  


 


BUN  


 


Creatinine  


 


Glucose  


 


POC Glucose (mg/dL)   310 H


 


Total Creatine Kinase  


 


Total Protein  














- Diagnostic Findings


Chest x-ray: image reviewed





Assessment and Plan


Assessment: 





Impression:





#1 Acute on chronic hypoxic respiratory failure secondary to suspected 

pulmonary embolism as well as acute exacerbation of chronic obstructive 

pulmonary disease and possible acute on chronic systolic congestive heart 

failure.  Echocardiogram pending.





#2 Oxygen dependent chronic obstructive pulmonary disease.





#3 History of chronic tobacco dependence.





#4 History of prostate cancer status post radiation with lower extremity 

neuropathy.





#5 Lower extremity edema and suspect secondary to congestive heart failure.





#6 History of coronary artery disease.





#7 Diabetes mellitus.





#8 Gastroesophageal reflux disease.





#9 Hyperlipidemia.





#10 Acute renal failure secondary to diuretics.





Plan:





The patient was seen and evaluated by Dr. Maria.  Chest x-ray, VQ scan and 

labs were all reviewed.  We'll go ahead and continue with the heparin drip for 

now.  Intermediate probability of PE.  The patient does have a history of 

recent progressive prostate cancer.  We'll also treat him for COPD 

exacerbation.  Continue bronchodilators, Symbicort and IV Solu-Medrol.  He was 

due to be seen in our office as a new patient.  He does have a pulmonologist in 

[place].  He is oxygen dependent.  We will continue to follow and make further 

recommendations based on his clinical status.





I, the cosigning physician, performed a history & physical examination of the 

patient. Lungs sounds have bilateral end expiratory wheeze, diminished.  

Maintaining good O2 saturations in the 90s on 3 L/m per nasal cannula.  I 

discussed the assessment and plan of care with my nurse practitioner, Kalli Medel. I attest to the above note as dictated by her.


Time with Patient: Greater than 30

## 2018-06-05 NOTE — CDI
Documentation Clarification Form





Date:  6/5/2018

CDS: Mandi Garg, CCS, CCDS

Phone: [phone]

Admit Date: 6/4/2018   

Account #: [id number]

Patient Name: Lawrence Echavarria

   

ATTENTION: The Clinical Documentation Specialists (CDI) and [facility] Coding Staff 
appreciate your assistance in clarifying documentation. Please respond to the 
clarification below the line at the bottom and electronically sign. The CDI & 
[facility] Coding staff will review the response and follow-up if needed. Please note: 
Queries are made part of the Legal Health Record. If you have any questions, 
please contact the author of this message via ITS.



Dr. Raghav Silva:



Patient presented to  with increased SOB. Diagnosed with Acute Exac COPD, 
Acute on Chronic Systolic CHF, Acute on Chronic Hypoxic Respiratory Failure, 
possible PE and Acute Kidney Failure.

Per the History & Physicial: Increased creatinine with chronic kidney disease.



History: Hypertension, CHF, COPD, DM II, GERD, on home O2.



Clinical Indicators:

LAB: BUN 59,  Creatinine 1.82,  GFR 33,  Glucose 157

Baseline is not known or documented.



Treatment: IV Lasix, IV Solumedrol, IV Heparin, Albuterol INH, O2 3Lnc. No IV 
fluids given d/t CHF, chronic pulmonary edema & possible PE.

Home meds: Zaroxolyn, K Dur, Lasix, Tenormin, Metformin, Amaryl



In order to capture the severity of condition, please clarify if the condition 
signifies:



    CKD Stage 1 (GFR > 90)

    CKD Stage 2 (GFR 60-89)

    CKD Stage 3 (GFR 30-59)

    CKD Stage 4 (GFR 15-29)

    CKD Stage 5 (GFR <15)

    Other, please specify

    Unable to determine



Please continue to document in your progress notes and discharge summary in 
order to capture severity of illness and risk of mortality. Include clinical 
findings that support your diagnosis.

________________________________________________________________________________
_____



CKD Stage 3 (GFR 30-59)
MTDD

## 2018-06-05 NOTE — NM
EXAMINATION TYPE: NM pul vent and perfuse

 

DATE OF EXAM: 6/5/2018

 

COMPARISON: NONE

 

HISTORY: Elevated d-dimer, short of breath lower extremity swelling

 

TECHNIQUE:  Utilizing inhalation of 39.5 mCi Tc 99m DTPA aerosol and intravenous injection of 5.35 mC
i of Tc 99m MAA, ventilation and perfusion images are acquired post injection in multiple projections
.

 

FINDINGS: 

There is a moderate-sized fusion defect along the lateral right lung. On the oblique view there appea
rs to be right basilar defect larger on perfusion. Large mismatched defects are not evident. There is
 a somewhat patchy distribution on the ventilation portion of the study. 

 

Small triple match defect at the right base may be present. This would place the probability for pulm
onary embolism into the low end of the intermediate range, 22%. With the additional defects above thi
s raises the probability further into the intermediate range, between 20% and 80%.

 

IMPRESSION: 

Intermediate probability for pulmonary embolism based on PIOPED 2 criteria.

## 2018-06-06 LAB
ANION GAP SERPL CALC-SCNC: 19 MMOL/L
BASOPHILS # BLD AUTO: 0 K/UL (ref 0–0.2)
BASOPHILS NFR BLD AUTO: 0 %
BUN SERPL-SCNC: 68 MG/DL (ref 9–20)
CALCIUM SPEC-MCNC: 7.9 MG/DL (ref 8.4–10.2)
CHLORIDE SERPL-SCNC: 93 MMOL/L (ref 98–107)
CO2 SERPL-SCNC: 29 MMOL/L (ref 22–30)
EOSINOPHIL # BLD AUTO: 0 K/UL (ref 0–0.7)
EOSINOPHIL NFR BLD AUTO: 0 %
ERYTHROCYTE [DISTWIDTH] IN BLOOD BY AUTOMATED COUNT: 2.99 M/UL (ref 4.3–5.9)
ERYTHROCYTE [DISTWIDTH] IN BLOOD: 16.7 % (ref 11.5–15.5)
GLUCOSE BLD-MCNC: 250 MG/DL (ref 75–99)
GLUCOSE BLD-MCNC: 310 MG/DL (ref 75–99)
GLUCOSE BLD-MCNC: 316 MG/DL (ref 75–99)
GLUCOSE BLD-MCNC: 335 MG/DL (ref 75–99)
GLUCOSE SERPL-MCNC: 270 MG/DL (ref 74–99)
HCT VFR BLD AUTO: 28.2 % (ref 39–53)
HGB BLD-MCNC: 9 GM/DL (ref 13–17.5)
LYMPHOCYTES # SPEC AUTO: 0.4 K/UL (ref 1–4.8)
LYMPHOCYTES NFR SPEC AUTO: 7 %
MCH RBC QN AUTO: 30.1 PG (ref 25–35)
MCHC RBC AUTO-ENTMCNC: 31.9 G/DL (ref 31–37)
MCV RBC AUTO: 94.3 FL (ref 80–100)
MONOCYTES # BLD AUTO: 0.3 K/UL (ref 0–1)
MONOCYTES NFR BLD AUTO: 5 %
NEUTROPHILS # BLD AUTO: 5 K/UL (ref 1.3–7.7)
NEUTROPHILS NFR BLD AUTO: 88 %
PLATELET # BLD AUTO: 260 K/UL (ref 150–450)
POTASSIUM SERPL-SCNC: 3.6 MMOL/L (ref 3.5–5.1)
SODIUM SERPL-SCNC: 141 MMOL/L (ref 137–145)
WBC # BLD AUTO: 5.7 K/UL (ref 3.8–10.6)

## 2018-06-06 RX ADMIN — GLIMEPIRIDE SCH MG: 2 TABLET ORAL at 07:53

## 2018-06-06 RX ADMIN — BUDESONIDE AND FORMOTEROL FUMARATE DIHYDRATE SCH: 160; 4.5 AEROSOL RESPIRATORY (INHALATION) at 08:51

## 2018-06-06 RX ADMIN — ASPIRIN 325 MG ORAL TABLET SCH MG: 325 PILL ORAL at 07:52

## 2018-06-06 RX ADMIN — THEOPHYLLINE ANHYDROUS SCH MG: 300 CAPSULE, EXTENDED RELEASE ORAL at 07:55

## 2018-06-06 RX ADMIN — METHYLPREDNISOLONE SODIUM SUCCINATE SCH MG: 125 INJECTION, POWDER, FOR SOLUTION INTRAMUSCULAR; INTRAVENOUS at 16:24

## 2018-06-06 RX ADMIN — I-VITE, TAB 1000-60-2MG (60/BT) SCH EACH: TAB at 07:56

## 2018-06-06 RX ADMIN — NITROGLYCERIN SCH INCH: 20 OINTMENT TOPICAL at 07:55

## 2018-06-06 RX ADMIN — NAPROXEN SCH MG: 250 TABLET ORAL at 20:02

## 2018-06-06 RX ADMIN — GLIMEPIRIDE SCH MG: 2 TABLET ORAL at 20:02

## 2018-06-06 RX ADMIN — IPRATROPIUM BROMIDE AND ALBUTEROL SULFATE SCH ML: .5; 3 SOLUTION RESPIRATORY (INHALATION) at 19:22

## 2018-06-06 RX ADMIN — BUDESONIDE AND FORMOTEROL FUMARATE DIHYDRATE SCH PUFF: 160; 4.5 AEROSOL RESPIRATORY (INHALATION) at 08:55

## 2018-06-06 RX ADMIN — INSULIN ASPART SCH UNIT: 100 INJECTION, SOLUTION INTRAVENOUS; SUBCUTANEOUS at 21:57

## 2018-06-06 RX ADMIN — INSULIN ASPART SCH UNIT: 100 INJECTION, SOLUTION INTRAVENOUS; SUBCUTANEOUS at 06:23

## 2018-06-06 RX ADMIN — LORATADINE SCH MG: 10 TABLET ORAL at 07:54

## 2018-06-06 RX ADMIN — IPRATROPIUM BROMIDE AND ALBUTEROL SULFATE SCH ML: .5; 3 SOLUTION RESPIRATORY (INHALATION) at 08:55

## 2018-06-06 RX ADMIN — METHYLPREDNISOLONE SODIUM SUCCINATE SCH MG: 125 INJECTION, POWDER, FOR SOLUTION INTRAMUSCULAR; INTRAVENOUS at 11:48

## 2018-06-06 RX ADMIN — POTASSIUM CHLORIDE SCH MEQ: 750 TABLET, EXTENDED RELEASE ORAL at 07:55

## 2018-06-06 RX ADMIN — NITROGLYCERIN SCH INCH: 20 OINTMENT TOPICAL at 16:25

## 2018-06-06 RX ADMIN — ATENOLOL SCH MG: 50 TABLET ORAL at 07:52

## 2018-06-06 RX ADMIN — IPRATROPIUM BROMIDE AND ALBUTEROL SULFATE SCH ML: .5; 3 SOLUTION RESPIRATORY (INHALATION) at 15:54

## 2018-06-06 RX ADMIN — IPRATROPIUM BROMIDE AND ALBUTEROL SULFATE SCH ML: .5; 3 SOLUTION RESPIRATORY (INHALATION) at 11:55

## 2018-06-06 RX ADMIN — METHYLPREDNISOLONE SODIUM SUCCINATE SCH MG: 125 INJECTION, POWDER, FOR SOLUTION INTRAMUSCULAR; INTRAVENOUS at 23:12

## 2018-06-06 RX ADMIN — FUROSEMIDE SCH MG: 10 INJECTION, SOLUTION INTRAMUSCULAR; INTRAVENOUS at 07:53

## 2018-06-06 RX ADMIN — NITROGLYCERIN SCH INCH: 20 OINTMENT TOPICAL at 11:58

## 2018-06-06 RX ADMIN — NAPROXEN SCH MG: 250 TABLET ORAL at 07:54

## 2018-06-06 RX ADMIN — METHYLPREDNISOLONE SODIUM SUCCINATE SCH MG: 125 INJECTION, POWDER, FOR SOLUTION INTRAMUSCULAR; INTRAVENOUS at 06:23

## 2018-06-06 RX ADMIN — IPRATROPIUM BROMIDE AND ALBUTEROL SULFATE SCH: .5; 3 SOLUTION RESPIRATORY (INHALATION) at 08:51

## 2018-06-06 RX ADMIN — APIXABAN SCH MG: 5 TABLET, FILM COATED ORAL at 20:06

## 2018-06-06 RX ADMIN — MONTELUKAST SODIUM SCH MG: 10 TABLET, FILM COATED ORAL at 07:55

## 2018-06-06 RX ADMIN — INSULIN DETEMIR SCH UNIT: 100 INJECTION, SOLUTION SUBCUTANEOUS at 08:10

## 2018-06-06 RX ADMIN — ATORVASTATIN CALCIUM SCH MG: 20 TABLET, FILM COATED ORAL at 07:52

## 2018-06-06 RX ADMIN — SODIUM CHLORIDE SCH MLS/HR: 450 INJECTION, SOLUTION INTRAVENOUS at 16:25

## 2018-06-06 RX ADMIN — FUROSEMIDE SCH MG: 10 INJECTION, SOLUTION INTRAMUSCULAR; INTRAVENOUS at 20:02

## 2018-06-06 RX ADMIN — NITROGLYCERIN SCH INCH: 20 OINTMENT TOPICAL at 20:02

## 2018-06-06 RX ADMIN — INSULIN ASPART SCH UNIT: 100 INJECTION, SOLUTION INTRAVENOUS; SUBCUTANEOUS at 11:48

## 2018-06-06 RX ADMIN — BUDESONIDE AND FORMOTEROL FUMARATE DIHYDRATE SCH PUFF: 160; 4.5 AEROSOL RESPIRATORY (INHALATION) at 19:20

## 2018-06-06 RX ADMIN — Medication SCH EACH: at 07:53

## 2018-06-06 RX ADMIN — TAMSULOSIN HYDROCHLORIDE SCH MG: 0.4 CAPSULE ORAL at 07:55

## 2018-06-06 RX ADMIN — INSULIN ASPART SCH UNIT: 100 INJECTION, SOLUTION INTRAVENOUS; SUBCUTANEOUS at 16:23

## 2018-06-06 RX ADMIN — PANTOPRAZOLE SODIUM SCH MG: 40 TABLET, DELAYED RELEASE ORAL at 06:23

## 2018-06-06 RX ADMIN — LINAGLIPTIN SCH MG: 5 TABLET, FILM COATED ORAL at 07:53

## 2018-06-06 NOTE — XR
EXAMINATION TYPE: XR chest 1V portable

 

DATE OF EXAM: 6/6/2018

 

COMPARISON: 6/4/2018

 

HISTORY: Congestive heart failure and shortness of breath

 

TECHNIQUE: Single frontal view of the chest is obtained.

 

FINDINGS:  Scattered left basilar subsegmental atelectasis is seen. There is overall improvement in t
he previously noted mild interstitial pulmonary edema. There is no cephalization, sizable pleural eff
usion or pneumothorax. Cardiomediastinal silhouette is enlarged. Slightly hypoventilatory lungs accen
tuates the pulmonary vasculature. There is diffuse osseous demineralization.

 

IMPRESSION:  Multifocal linear left basilar atelectasis. Improvement in previously noted interstitial
 edema.

## 2018-06-06 NOTE — P.PN
Subjective


Progress Note Date: 06/06/18


Principal diagnosis: 





Acute on chronic hypoxic respiratory failure secondary to suspected pulmonary 

embolism and acute exacerbation of chronic obstructive pulmonary disease.





This is a very pleasant 84-year-old gentleman who follows with Dr. Villela as 

his primary care physician.  He has a history of hyperlipidemia, hypertension, 

coronary artery disease, congestive heart failure, diabetes mellitus, 

gastroesophageal reflux disease.  He also has a history of chronic obstructive 

pulmonary disease and was recently, in March 2018, started on home oxygen.  He 

was due to see Dr. Bridges in our office as a new patient this week.  He has been 

seen by a pulmonologist in [place] where he spends his lira.  He is on 

Trelegy, theophylline, Singulair, albuterol in the outpatient setting.  He is 

unsure of his pulmonary function numbers.  He does have a history of prostate 

cancer diagnosed in August 2017.  He had undergone 25 external radiation 

treatments and subsequently sent to [place] for a 26 internal radiation with 

seed implants.  He states since that last treatment he has had lower extremity 

weakness and worsening shortness of breath.  He has been seen by a neurologist 

who states his lower extremity weakness is secondary to the radiation 

treatments.  Neuropathy.  Over the past several weeks he has also been having 

complaints of increasing swelling in the lower extremities and had been seen in 

[facility] treated with diuretics and released.  The swelling started 

again and he had been seen by Dr. Villela who at this time referred him here 

yesterday for further evaluation.  He is seen today in consultation on the 

selective care unit.  He is awake and alert in no acute distress.  He is 

dyspneic with minimal exertion and minimal conversation.  He states this is not 

new but has been getting progressively worse.  Troponins have been negative.  

ProBNP 1050.  Creatinine 1.82.  White count 2.9.  Hemoglobin 9.9.  D-dimer 

2.20.  Doppler studies of the lower extremities were negative for DVT 

bilaterally.  VQ scan revealed a moderate-sized effusion defect along the 

lateral right lung and a right basilar defect.  Read as intermediate 

probability for pulmonary embolism.  The patient is currently on a heparin 

drip.  He is also receiving Lasix 40 mg IV push every 12 hours.  He is 

currently maintaining good O2 saturations in the 90s on 3 L/m per nasal 

cannula.  He's been afebrile.  Hemodynamically stable.  Initiated on 

bronchodilators and IV Solu-Medrol. 





The patient is seen again today 06/06/2018 in follow-up on the selective care 

unit.  He is awake and alert in no acute distress.  He is up ambulating with 

physical therapy without any significant shortness of breath.  He is breathing 

easier today as compared to yesterday.  Maintaining O2 saturations in the 90s 

on 3 L/m per nasal cannula.  He's been afebrile.  Hemodynamically stable.  

White count 5.7.  Hemoglobin 9.0.  Creatinine 1.53.  Blood cultures reveal no 

growth to date.





Objective





- Vital Signs


Vital signs: 


 Vital Signs











Temp  97.4 F L  06/06/18 03:11


 


Pulse  92   06/06/18 09:13


 


Resp  21   06/06/18 03:14


 


BP  134/78   06/06/18 03:11


 


Pulse Ox  91 L  06/06/18 03:11








 Intake & Output











 06/05/18 06/06/18 06/06/18





 18:59 06:59 18:59


 


Intake Total 825.968 438.901 240


 


Output Total 300 700 


 


Balance 525.968 -261.099 240


 


Weight 85.2 kg 85 kg 


 


Intake:   


 


    


 


    Heparin Sodium,Porcine/ 250  





    D5w Pmx 25,000 unit In   





    Dextrose/Water 1 500ml.   





    bag @ 18 UNITS/KG/HR 31.   





    35 mls/hr IV .[id number] ALFREDA   





    Rx#:[id number]   


 


  Intake, IV Titration 335.968 438.901 





  Amount   


 


    Heparin Sodium,Porcine/ 335.968 438.901 





    D5w Pmx 25,000 unit In   





    Dextrose/Water 1 500ml.   





    bag @ 18 UNITS/KG/HR 31.   





    35 mls/hr IV .[id number] ALFREDA   





    Rx#:[id number]   


 


  Oral 240  240


 


Output:   


 


  Urine 300 700 


 


Other:   


 


  Voiding Method Urinal Urinal 


 


  # Voids  1 














- Exam





- Constitutional


General appearance: average body habitus, no acute distress





- EENT


Eyes: EOMI, PERRLA


ENT: hard of hearing


Ears: bilateral: normal





- Neck


Neck: normal ROM


Carotids: bilateral: upstroke normal


Thyroid: bilateral: normal size





- Respiratory


Respiratory: bilateral: diminished, wheezing





- Cardiovascular


Rhythm: regular


Heart sounds: normal: S1, S2





- Gastrointestinal


General gastrointestinal: normal bowel sounds, no organomegaly, soft, no 

tenderness





- Neurologic


Neurologic: CNII-XII intact





- Musculoskeletal


Musculoskeletal: generalized weakness





- Psychiatric


Psychiatric: A&O x's 3, appropriate affect, intact judgment & insight








- Labs


CBC & Chem 7: 


 06/06/18 05:34





 06/06/18 05:34


Labs: 


 Abnormal Lab Results - Last 24 Hours (Table)











  06/04/18 06/05/18 06/05/18 Range/Units





  23:56 11:43 16:08 


 


RBC     (4.30-5.90)  m/uL


 


Hgb     (13.0-17.5)  gm/dL


 


Hct     (39.0-53.0)  %


 


RDW     (11.5-15.5)  %


 


Lymphocytes #     (1.0-4.8)  k/uL


 


APTT     (22.0-30.0)  sec


 


Chloride     ()  mmol/L


 


BUN     (9-20)  mg/dL


 


Creatinine     (0.66-1.25)  mg/dL


 


Glucose     (74-99)  mg/dL


 


POC Glucose (mg/dL)   307 H  352 H  (75-99)  mg/dL


 


Hemoglobin A1c  6.9 H    (4.0-6.0)  %


 


Calcium     (8.4-10.2)  mg/dL














  06/05/18 06/05/18 06/06/18 Range/Units





  21:23 23:52 05:34 


 


RBC    2.99 L  (4.30-5.90)  m/uL


 


Hgb    9.0 L  (13.0-17.5)  gm/dL


 


Hct    28.2 L  (39.0-53.0)  %


 


RDW    16.7 H  (11.5-15.5)  %


 


Lymphocytes #    0.4 L  (1.0-4.8)  k/uL


 


APTT   47.3 H   (22.0-30.0)  sec


 


Chloride     ()  mmol/L


 


BUN     (9-20)  mg/dL


 


Creatinine     (0.66-1.25)  mg/dL


 


Glucose     (74-99)  mg/dL


 


POC Glucose (mg/dL)  336 H    (75-99)  mg/dL


 


Hemoglobin A1c     (4.0-6.0)  %


 


Calcium     (8.4-10.2)  mg/dL














  06/06/18 06/06/18 Range/Units





  05:34 05:48 


 


RBC    (4.30-5.90)  m/uL


 


Hgb    (13.0-17.5)  gm/dL


 


Hct    (39.0-53.0)  %


 


RDW    (11.5-15.5)  %


 


Lymphocytes #    (1.0-4.8)  k/uL


 


APTT    (22.0-30.0)  sec


 


Chloride  93 L   ()  mmol/L


 


BUN  68 H   (9-20)  mg/dL


 


Creatinine  1.53 H   (0.66-1.25)  mg/dL


 


Glucose  270 H   (74-99)  mg/dL


 


POC Glucose (mg/dL)   316 H  (75-99)  mg/dL


 


Hemoglobin A1c    (4.0-6.0)  %


 


Calcium  7.9 L   (8.4-10.2)  mg/dL








 Microbiology - Last 24 Hours (Table)











 06/04/18 17:27 Blood Culture - Preliminary





 Blood    No Growth after 24 hours














Assessment and Plan


Assessment: 





Impression:





#1 Acute on chronic hypoxic respiratory failure secondary to suspected 

pulmonary embolism as well as acute exacerbation of chronic obstructive 

pulmonary disease and possible acute on chronic diastolic congestive heart 

failure. 





#2 Oxygen dependent chronic obstructive pulmonary disease.





#3 History of chronic tobacco dependence.





#4 History of prostate cancer status post radiation with lower extremity 

neuropathy.





#5 Lower extremity edema and suspect secondary to congestive heart failure.





#6 History of coronary artery disease.





#7 Diabetes mellitus.





#8 Gastroesophageal reflux disease.





#9 Hyperlipidemia.





#10 Acute renal failure secondary to diuretics.





Plan:





The patient was seen and evaluated by Dr. Maria. We'll continue to treat him 

for COPD exacerbation.  Continue bronchodilators, Symbicort and IV Solu-Medrol.

    Repeat a chest x-ray.  He will need to be transitioned to an oral 

anticoagulant.  We will continue to follow and make further recommendations 

based on his clinical status.





I, the cosigning physician, performed a history & physical examination of the 

patient. Lungs sounds have bilateral end expiratory wheeze, diminished.  

Maintaining good O2 saturations in the 90s on 3 L/m per nasal cannula.  I 

discussed the assessment and plan of care with my nurse practitioner, Kalli Medel. I attest to the above note as dictated by her.

## 2018-06-06 NOTE — CDI
Last Revision, December 2017





Documentation Clarification Form



Date: 6/6/2018 11:32:00 AM

From: Mandi MagallanesGargMONROE, CCDS

Phone: [phone]

MRN: [id number]

Admit Date: 6/4/2018 7:46:00 PM

Patient Name: Lawrence Echavarria 

Visit Number: [id number]

Discharge Date:



ATTENTION: The Clinical Documentation Specialists (CDI) and [facility] Coding Staff 
appreciate your assistance in clarifying documentation. Please respond to the 
clarification below the line at the bottom and electronically sign. The CDI & 
[facility] Coding staff will review the response and follow-up if needed. Please note: 
Queries are made part of the Legal Health Record. If you have any questions, 
please contact the author of this message via ITS.



Dr. Raghav Silva:



There is conflicting documentation in the medical record regarding congestive 
heart failure: 



Per the Cardiology consult on 6/5: "I do not believe we are dealing with any 
systolic heart failure without any clinical evidence of heart failure and also 
normal BNP."

Per the Pulmonary consult & subsequent progress note: "Acute on chronic hypoxic 
respiratory failure  secondary to suspected pulmonary embolism as well as acute 
exacerbation of chronic obstructive pulmonary disease and possible acute on 
chronic systolic congestive heart failure."



History/Risk Factors: COPD O2 dep, CHF nos, DM, Hypertension & Hyperlipidemia.

Clinical Indicators:  Presented with SOB & lower extremity edema.

VS: T 99.4, P 78, R 22 (sob), /63, PO 90 ra         BNP: 1050

Echocardiogram Results 6/5: Left Ventricular systolic function normal w/EF 55-60
%, right ventricle mildly enlarged, Tr MR, Mild TR, Mild pulmonary hypertension.

Chest X Ray: 6/4: Mild interstitial phase cardiogenic pulmonary edema. 6/6: 
Left basilar atelectasis, improvement in interstitial edema.

Home Rx: Lasix 20 mg, Home O2.

Treatment:  IV Lasix 40 mg x1 in ED, 



In your professional opinion, can you please clarify the acuity and type of CHF 
is present? 

    Acute 

    Chronic

    Acute on Chronic  



    Systolic or Diastolic Heart Failure or combined



    Unable to Determine

    Other, please specify



Please continue to document in your progress notes and discharge summary in 
order to capture severity of illness and risk of mortality. Include clinical 
findings that support your diagnosis.

___________________________________________________________________

Chronic

   Diastolic Heart Failure
MTDD

## 2018-06-06 NOTE — PN
PROGRESS NOTE



Mr. Echavarria has advanced COPD.  He came in with exacerbation.  There was intermediate

probability V/Q scan.  The patient is being treated with anticoagulation.  Dr. Maria

feels that, given his presentation, he will be better off with 3-month anticoagulation.

I do not have an objection.  Cardiac-wise he is stable. No anginal symptoms.  Vitals

are stable. S1, S2 heard normally. Short systolic murmur noted. Lungs reveal improved

air entry. Abdomen and lower extremity exam is unchanged.  Clinically patient is doing

better than yesterday.  We will see him as needed from a cardiac standpoint.





MMODL / IJN: [id number] / Job#: [id number]

## 2018-06-06 NOTE — P.PN
Subjective


Progress Note Date: 06/06/18


Principal diagnosis: 





Acute COPD exacerbation and pulmonary embolism





Patient is a 84-year-old male with a known history of hypertension, 

hyperlipidemia, coronary artery disease, diabetes type 2, COPD on home oxygen 

started on recently in March 2018 and also prostate cancer diagnosed in August 2017 status post 25 radiation treatments and subsequently seen at [facility] for a 26 internal radiation with seed implants.  Patient presented to 

ER with worsening shortness of breath and also lower exudate weakness as well 

as swelling on admission.


Patient was seen at [facility] for bilateral lower swelling and was 

treated with diuretics.  Patient was seen by Dr. Villela and referred him to ER 

for further radiation or shortness of breath.


D-dimer 2.2 and VQ scan showed moderate probably for PE.  Patient was started 

on heparin drip.


Patient also receiving Lasix 40 mg twice a day.  BNP 1050.  Troponin 3 

negative.  Creatinine 1.82 admission.


Patient is being followed by pulmonary and cardiology.  2-D echo cardiac exam 

showed normal EF.





On 06/05/2018


Patient is pretty status is improving at this time.  He is being continued on 

heparin drip due to moderate possible for PE.  CTA chest could not be done due 

to elevated creatinine level.  Due to the fact that patient has been having 

worsening shortness of breath recently and also history of prostate cancer, he'

ll be continued on antibiotic regulation at this time.  Patient is also on 

Lasix.  Cardiology and pulmonary is following.  Unlikely acute CHF.


Patient is being continued on breathing treatments and steroids.


No nausea vomiting or abdominal pain.  No fever no chills.  No chest pain or 

worsening shortness of breath.





06/06/2018


Patient's breathing status is much improved now.  Improved air entry bilateral 

lung fields.  Patient is being continued on heparin drip and will be 

transitioned to oral anticoagulation.  Patient recovered on Lasix.  Chest x-ray 

showed improvement in interstitial edema.  Renal function improved with 

creatinine level I.5


Otherwise patient is improving overall.  Patient is able to saturate well on 

nasal cannula.  No chest pain.  No fever no chills.  No acute overnight issues.








All other review of systems negative except the above





Current medications reviewed.





Active Medications











Generic Name Dose Route Start Last Admin





  Trade Name Freq  PRN Reason Stop Dose Admin


 


Albuterol/Ipratropium  3 ml  06/05/18 01:29  





  Duoneb 0.5 Mg-3 Mg/3 Ml Soln  INHALATION   





  RT-QID PRN   





  Shortness Of Breath Or Wheezing   


 


Albuterol/Ipratropium  3 ml  06/05/18 08:00  06/06/18 15:54





  Duoneb 0.5 Mg-3 Mg/3 Ml Soln  INHALATION   3 ml





  RT-QID ALFREDA   Administration


 


Apixaban  10 mg  06/06/18 21:00  





  Eliquis  PO  06/13/18 21:01  





  BID ALFREDA   


 


Aspirin  325 mg  06/05/18 09:00  06/06/18 07:52





  Aspirin  PO   325 mg





  DAILY ALFREDA   Administration


 


Atenolol  50 mg  06/05/18 09:00  06/06/18 07:52





  Tenormin  PO   50 mg





  DAILY ALFREDA   Administration


 


Atorvastatin Calcium  20 mg  06/05/18 09:00  06/06/18 07:52





  Lipitor  PO   20 mg





  DAILY ALFREDA   Administration


 


Budesonide/Formoterol Fumarate  2 puff  06/05/18 20:00  06/06/18 08:55





  Symbicort 160-4.5 Mcg Inhaler  INHALATION   2 puff





  RT-BID ALFREDA   Administration


 


Furosemide  40 mg  06/04/18 21:00  06/06/18 07:53





  Lasix  IV   40 mg





  Q12HR ALFREDA   Administration


 


Glimepiride  2 mg  06/04/18 21:00  06/06/18 07:53





  Amaryl  PO   2 mg





  BID ALFREDA   Administration


 


Insulin Aspart  0 unit  06/04/18 21:00  06/06/18 16:23





  Novolog  SQ   8 unit





  ACHS ALFREDA   Administration





  Protocol   


 


Insulin Detemir  10 unit  06/05/18 12:00  06/06/18 08:10





  Levemir  SQ   10 unit





  DAILY ALFREDA   Administration


 


Linagliptin  5 mg  06/05/18 09:00  06/06/18 07:53





  Tradjenta  PO   5 mg





  DAILY ALFREDA   Administration


 


Loratadine  10 mg  06/05/18 09:00  06/06/18 07:54





  Claritin  PO   10 mg





  DAILY ALFREDA   Administration


 


Metformin HCl  500 mg  06/04/18 21:00  06/06/18 07:54





  Glucophage  PO   500 mg





  BID ALFREDA   Administration


 


Methylprednisolone Sodium Succinate  60 mg  06/05/18 14:00  06/06/18 16:24





  Solu-Medrol  IV   60 mg





  Q6HR ALFREDA   Administration


 


Montelukast Sodium  10 mg  06/05/18 09:00  06/06/18 07:55





  Singulair  PO   10 mg





  DAILY ALFREDA   Administration


 


Multivitamins/Minerals  1 each  06/05/18 09:00  06/06/18 07:56





  Ivite  PO   1 each





  DAILY ALFREDA   Administration


 


Naproxen  500 mg  06/04/18 21:00  06/06/18 07:54





  Naprosyn  PO   500 mg





  BID ALFREDA   Administration


 


Nitroglycerin  1 inch  06/04/18 22:00  06/06/18 16:25





  Nitro-Bid Oint  TOPICAL   1 inch





  QID ALFREDA   Administration


 


Pantoprazole Sodium  40 mg  06/05/18 07:30  06/06/18 06:23





  Protonix  PO   40 mg





  AC-BRKFST ALFREDA   Administration


 


Potassium Chloride  10 meq  06/05/18 09:00  06/06/18 07:55





  K-Dur 10  PO   10 meq





  DAILY ALFREDA   Administration


 


Tamsulosin HCl  0.4 mg  06/05/18 09:00  06/06/18 07:55





  Flomax  PO   0.4 mg





  DAILY ALFREDA   Administration


 


Theophylline  300 mg  06/05/18 09:00  06/06/18 07:55





  Matt-24  PO   300 mg





  DAILY ALFREDA   Administration


 


Vitamin B Complex/Vit C/Vit E/Zinc  1 each  06/05/18 09:00  06/06/18 07:53





  Z-Bec  PO   1 each





  DAILY ALFREDA   Administration














Objective





- Vital Signs


Vital signs: 


 Vital Signs











Temp  97.4 F L  06/06/18 16:00


 


Pulse  88   06/06/18 16:04


 


Resp  18   06/06/18 16:00


 


BP  130/60   06/06/18 16:00


 


Pulse Ox  95   06/06/18 16:00








 Intake & Output











 06/05/18 06/06/18 06/06/18





 18:59 06:59 18:59


 


Intake Total 825.968 438.901 950.217


 


Output Total 300 700 


 


Balance 525.968 -261.099 950.217


 


Weight 85.2 kg 85 kg 


 


Intake:   


 


    


 


    Heparin Sodium,Porcine/ 250  





    D5w Pmx 25,000 unit In   





    Dextrose/Water 1 500ml.   





    bag @ 18 UNITS/KG/HR 31.   





    35 mls/hr IV .[id number] [facility]   





    Rx#:[id number]   


 


  Intake, IV Titration 335.968 438.901 470.217





  Amount   


 


    Heparin Sodium,Porcine/ 335.968 438.901 470.217





    D5w Pmx 25,000 unit In   





    Dextrose/Water 1 500ml.   





    bag @ 18 UNITS/KG/HR 31.   





    35 mls/hr IV .[id number] [facility]   





    Rx#:[id number]   


 


  Oral 240  480


 


Output:   


 


  Urine 300 700 


 


Other:   


 


  Voiding Method Urinal Urinal 


 


  # Voids  1 














- Exam





PHYSICAL EXAMINATION: 


Patient is lying in the bed comfortably, no acute distress, awake alert and 

oriented.. 


HEENT: Normocephalic. Neck is supple. Pupils reactive. Nostrils clear. Oral 

cavity is moist. Ears reveal no drainage. 


Neck reveals no JVD, carotid bruits, or thyromegaly. 


CHEST EXAMINATION: Trachea is central. Symmetrical expansion. Lung fields clear 

to auscultation and percussion. 


CARDIAC: Normal S1, S2 with no gallops. No murmurs 


ABDOMEN: Soft. Bowel sounds normal. No organomegaly. No abdominal bruits. 


Extremities: Bilateral lower extremity trace edema.  Venostasis changes.  No 

clubbing or cyanosis


Neurologically awake, alert, oriented x3 with well-coordinated movements.  No 

focal deficits noted


Skin: No rash or skin lesions. 


Psychiatric: Cooperative.  Nonsuicidal


Musculoskeletal: No joint swelling or deformity.  Normal range of motion.








- Labs


CBC & Chem 7: 


 06/06/18 05:34





 06/06/18 05:34


Labs: 


 Abnormal Lab Results - Last 24 Hours (Table)











  06/05/18 06/05/18 06/06/18 Range/Units





  21:23 23:52 05:34 


 


RBC    2.99 L  (4.30-5.90)  m/uL


 


Hgb    9.0 L  (13.0-17.5)  gm/dL


 


Hct    28.2 L  (39.0-53.0)  %


 


RDW    16.7 H  (11.5-15.5)  %


 


Lymphocytes #    0.4 L  (1.0-4.8)  k/uL


 


APTT   47.3 H   (22.0-30.0)  sec


 


Chloride     ()  mmol/L


 


BUN     (9-20)  mg/dL


 


Creatinine     (0.66-1.25)  mg/dL


 


Glucose     (74-99)  mg/dL


 


POC Glucose (mg/dL)  336 H    (75-99)  mg/dL


 


Calcium     (8.4-10.2)  mg/dL














  06/06/18 06/06/18 06/06/18 Range/Units





  05:34 05:48 11:46 


 


RBC     (4.30-5.90)  m/uL


 


Hgb     (13.0-17.5)  gm/dL


 


Hct     (39.0-53.0)  %


 


RDW     (11.5-15.5)  %


 


Lymphocytes #     (1.0-4.8)  k/uL


 


APTT     (22.0-30.0)  sec


 


Chloride  93 L    ()  mmol/L


 


BUN  68 H    (9-20)  mg/dL


 


Creatinine  1.53 H    (0.66-1.25)  mg/dL


 


Glucose  270 H    (74-99)  mg/dL


 


POC Glucose (mg/dL)   316 H  250 H  (75-99)  mg/dL


 


Calcium  7.9 L    (8.4-10.2)  mg/dL














  06/06/18 Range/Units





  16:20 


 


RBC   (4.30-5.90)  m/uL


 


Hgb   (13.0-17.5)  gm/dL


 


Hct   (39.0-53.0)  %


 


RDW   (11.5-15.5)  %


 


Lymphocytes #   (1.0-4.8)  k/uL


 


APTT   (22.0-30.0)  sec


 


Chloride   ()  mmol/L


 


BUN   (9-20)  mg/dL


 


Creatinine   (0.66-1.25)  mg/dL


 


Glucose   (74-99)  mg/dL


 


POC Glucose (mg/dL)  310 H  (75-99)  mg/dL


 


Calcium   (8.4-10.2)  mg/dL








 Microbiology - Last 24 Hours (Table)











 06/04/18 17:27 Blood Culture - Preliminary





 Blood    No Growth after 24 hours














Assessment and Plan


Assessment: 





Shortness of breath secondary to acute COPD exacerbation and also acute 

pulmonary embolism


Acute on chronic hypoxic respiratory failure secondary to above


Elevated d-dimer with moderate probability for PE on VQ scan


Unlikely acute CHF with normal EF and also BNP is not elevated


Acute on chronic kidney disease stage III


Hypertension


Hyperlipidemia


Diabetes type 2


GERD


Prostate cancer status post external and internal radiation


History of MI


CODE STATUS is DO NOT RESUSCITATE/DO NOT INTUBATE





Plan:


Patient was continued on heparin drip and changed to oral anticoagulation.  

Continued on Lasix and aspirin, atenolol..  Patient will be continued on 

breathing treatments and IV steroids.  Pulmonary and cardiology is following.  

Further recommendations based on clinical course.  Prognosis is guarded with 

multiple medical problems and comorbid conditions.


Time with Patient: Greater than 30

## 2018-06-06 NOTE — P.PN
Subjective


Progress Note Date: 06/05/18


Principal diagnosis: 





Acute COPD exacerbation and pulmonary embolism





Patient is a 84-year-old male with a known history of hypertension, 

hyperlipidemia, coronary artery disease, diabetes type 2, COPD on home oxygen 

started on recently in March 2018 and also prostate cancer diagnosed in August 2017 status post 25 radiation treatments and subsequently seen at [facility] for a 26 internal radiation with seed implants.  Patient presented to 

ER with worsening shortness of breath and also lower exudate weakness as well 

as swelling on admission.


Patient was seen at [facility] for bilateral lower swelling and was 

treated with diuretics.  Patient was seen by Dr. Villela and referred him to ER 

for further radiation or shortness of breath.


D-dimer 2.2 and VQ scan showed moderate probably for PE.  Patient was started 

on heparin drip.


Patient also receiving Lasix 40 mg twice a day.  BNP 1050.  Troponin 3 

negative.  Creatinine 1.82 admission.


Patient is being followed by pulmonary and cardiology.  2-D echo cardiac exam 

showed normal EF.





On 06/05/2018


Patient is pretty status is improving at this time.  He is being continued on 

heparin drip due to moderate possible for PE.  CTA chest could not be done due 

to elevated creatinine level.  Due to the fact that patient has been having 

worsening shortness of breath recently and also history of prostate cancer, he'

ll be continued on antibiotic regulation at this time.  Patient is also on 

Lasix.  Cardiology and pulmonary is following.  Unlikely acute CHF.


Patient is being continued on breathing treatments and steroids.


No nausea vomiting or abdominal pain.  No fever no chills.  No chest pain or 

worsening shortness of breath.


All other review of systems negative except the above





Current medications reviewed.





Objective





- Vital Signs


Vital signs: 


 Vital Signs











Temp  98 F   06/05/18 07:50


 


Pulse  96   06/05/18 12:00


 


Resp  18   06/05/18 12:00


 


BP  121/88   06/05/18 12:00


 


Pulse Ox  90 L  06/05/18 12:00








 Intake & Output











 06/04/18 06/05/18 06/05/18





 18:59 06:59 18:59


 


Intake Total  321.362 825.968


 


Output Total  1725 300


 


Balance  -1403.638 525.968


 


Weight 87.09 kg 85.2 kg 85.2 kg


 


Intake:   


 


  IV   250


 


    Heparin Sodium,Porcine/   250





    D5w Pmx 25,000 unit In   





    Dextrose/Water 1 500ml.   





    bag @ 18 UNITS/KG/HR 31.   





    35 mls/hr IV .[id number] ALFREDA   





    Rx#:[id number]   


 


  Intake, IV Titration  221.362 335.968





  Amount   


 


    Heparin Sodium,Porcine/  221.362 335.968





    D5w Pmx 25,000 unit In   





    Dextrose/Water 1 500ml.   





    bag @ 18 UNITS/KG/HR 31.   





    35 mls/hr IV .[id number] ALFREDA   





    Rx#:[id number]   


 


  Oral  100 240


 


Output:   


 


  Urine  1725 300


 


Other:   


 


  Voiding Method  Urinal Urinal


 


  # Voids  1 














- Exam





PHYSICAL EXAMINATION: 


Patient is lying in the bed comfortably, no acute distress, awake alert and 

oriented.. 


HEENT: Normocephalic. Neck is supple. Pupils reactive. Nostrils clear. Oral 

cavity is moist. Ears reveal no drainage. 


Neck reveals no JVD, carotid bruits, or thyromegaly. 


CHEST EXAMINATION: Trachea is central. Symmetrical expansion. Lung fields clear 

to auscultation and percussion. 


CARDIAC: Normal S1, S2 with no gallops. No murmurs 


ABDOMEN: Soft. Bowel sounds normal. No organomegaly. No abdominal bruits. 


Extremities: Bilateral lower extremity trace edema.  Venostasis changes.  No 

clubbing or cyanosis


Neurologically awake, alert, oriented x3 with well-coordinated movements.  No 

focal deficits noted


Skin: No rash or skin lesions. 


Psychiatric: Cooperative.  Nonsuicidal


Musculoskeletal: No joint swelling or deformity.  Normal range of motion.








- Labs


CBC & Chem 7: 


 06/06/18 05:34





 06/06/18 05:34


Labs: 


 Abnormal Lab Results - Last 24 Hours (Table)











  06/04/18 06/04/18 06/04/18 Range/Units





  17:27 17:27 17:27 


 


WBC   3.1 L   (3.8-10.6)  k/uL


 


RBC   3.16 L   (4.30-5.90)  m/uL


 


Hgb   9.6 L   (13.0-17.5)  gm/dL


 


Hct   29.4 L   (39.0-53.0)  %


 


RDW   16.7 H   (11.5-15.5)  %


 


Lymphocytes #     (1.0-4.8)  k/uL


 


Lymphocytes # (Manual)   0.68 L   (1.0-4.8)  k/uL


 


INR     (<1.2)  


 


APTT     (22.0-30.0)  sec


 


D-Dimer     (<0.60)  mg/L FEU


 


Chloride    97 L  ()  mmol/L


 


Carbon Dioxide    31 H  (22-30)  mmol/L


 


BUN    59 H  (9-20)  mg/dL


 


Creatinine    1.82 H  (0.66-1.25)  mg/dL


 


Glucose    157 H  (74-99)  mg/dL


 


POC Glucose (mg/dL)     (75-99)  mg/dL


 


Hemoglobin A1c     (4.0-6.0)  %


 


Total Creatine Kinase  46 L    ()  U/L


 


Total Protein    6.0 L  (6.3-8.2)  g/dL














  06/04/18 06/04/18 06/04/18 Range/Units





  17:27 20:32 23:56 


 


WBC     (3.8-10.6)  k/uL


 


RBC     (4.30-5.90)  m/uL


 


Hgb     (13.0-17.5)  gm/dL


 


Hct     (39.0-53.0)  %


 


RDW     (11.5-15.5)  %


 


Lymphocytes #     (1.0-4.8)  k/uL


 


Lymphocytes # (Manual)     (1.0-4.8)  k/uL


 


INR  1.2 H    (<1.2)  


 


APTT     (22.0-30.0)  sec


 


D-Dimer  2.20 H    (<0.60)  mg/L FEU


 


Chloride     ()  mmol/L


 


Carbon Dioxide     (22-30)  mmol/L


 


BUN     (9-20)  mg/dL


 


Creatinine     (0.66-1.25)  mg/dL


 


Glucose     (74-99)  mg/dL


 


POC Glucose (mg/dL)   190 H   (75-99)  mg/dL


 


Hemoglobin A1c    6.9 H  (4.0-6.0)  %


 


Total Creatine Kinase     ()  U/L


 


Total Protein     (6.3-8.2)  g/dL














  06/04/18 06/05/18 06/05/18 Range/Units





  23:56 05:40 06:01 


 


WBC   2.9 L   (3.8-10.6)  k/uL


 


RBC   3.24 L   (4.30-5.90)  m/uL


 


Hgb   9.9 L   (13.0-17.5)  gm/dL


 


Hct   30.6 L   (39.0-53.0)  %


 


RDW   16.6 H   (11.5-15.5)  %


 


Lymphocytes #   0.4 L   (1.0-4.8)  k/uL


 


Lymphocytes # (Manual)     (1.0-4.8)  k/uL


 


INR     (<1.2)  


 


APTT  60.1 H    (22.0-30.0)  sec


 


D-Dimer     (<0.60)  mg/L FEU


 


Chloride     ()  mmol/L


 


Carbon Dioxide     (22-30)  mmol/L


 


BUN     (9-20)  mg/dL


 


Creatinine     (0.66-1.25)  mg/dL


 


Glucose     (74-99)  mg/dL


 


POC Glucose (mg/dL)    310 H  (75-99)  mg/dL


 


Hemoglobin A1c     (4.0-6.0)  %


 


Total Creatine Kinase     ()  U/L


 


Total Protein     (6.3-8.2)  g/dL














  06/05/18 Range/Units





  11:43 


 


WBC   (3.8-10.6)  k/uL


 


RBC   (4.30-5.90)  m/uL


 


Hgb   (13.0-17.5)  gm/dL


 


Hct   (39.0-53.0)  %


 


RDW   (11.5-15.5)  %


 


Lymphocytes #   (1.0-4.8)  k/uL


 


Lymphocytes # (Manual)   (1.0-4.8)  k/uL


 


INR   (<1.2)  


 


APTT   (22.0-30.0)  sec


 


D-Dimer   (<0.60)  mg/L FEU


 


Chloride   ()  mmol/L


 


Carbon Dioxide   (22-30)  mmol/L


 


BUN   (9-20)  mg/dL


 


Creatinine   (0.66-1.25)  mg/dL


 


Glucose   (74-99)  mg/dL


 


POC Glucose (mg/dL)  307 H  (75-99)  mg/dL


 


Hemoglobin A1c   (4.0-6.0)  %


 


Total Creatine Kinase   ()  U/L


 


Total Protein   (6.3-8.2)  g/dL














Assessment and Plan


Assessment: 





Shortness of breath secondary to acute COPD exacerbation and also acute 

pulmonary embolism


Acute on chronic hypoxic respiratory failure


Elevated d-dimer with moderate probability for PE on VQ scan


Unlikely acute CHF with normal EF and also BNP is not elevated


Acute on chronic kidney disease stage III


Hypertension


Hyperlipidemia


Diabetes type 2


GERD


Prostate cancer status post external and internal radiation


History of MI


CODE STATUS is DO NOT RESUSCITATE/DO NOT INTUBATE





Plan:


Patient will be continued on heparin drip and will be changed to oral 

antibiotic regulation.  Continued on Lasix and aspirin, atenolol..  Patient 

will be continued on breathing treatments and IV steroids.  Pulmonary and 

cardiology is following.  Further recommendations based on clinical course.  

Prognosis is guarded with multiple medical problems and comorbid conditions.


Time with Patient: Greater than 30

## 2018-06-07 VITALS — RESPIRATION RATE: 18 BRPM

## 2018-06-07 LAB
ANION GAP SERPL CALC-SCNC: 17 MMOL/L
BASOPHILS # BLD AUTO: 0 K/UL (ref 0–0.2)
BASOPHILS NFR BLD AUTO: 0 %
BUN SERPL-SCNC: 79 MG/DL (ref 9–20)
CALCIUM SPEC-MCNC: 7.9 MG/DL (ref 8.4–10.2)
CHLORIDE SERPL-SCNC: 96 MMOL/L (ref 98–107)
CO2 SERPL-SCNC: 33 MMOL/L (ref 22–30)
EOSINOPHIL # BLD AUTO: 0 K/UL (ref 0–0.7)
EOSINOPHIL NFR BLD AUTO: 0 %
ERYTHROCYTE [DISTWIDTH] IN BLOOD BY AUTOMATED COUNT: 2.87 M/UL (ref 4.3–5.9)
ERYTHROCYTE [DISTWIDTH] IN BLOOD: 16.6 % (ref 11.5–15.5)
GLUCOSE BLD-MCNC: 144 MG/DL (ref 75–99)
GLUCOSE BLD-MCNC: 59 MG/DL (ref 75–99)
GLUCOSE BLD-MCNC: 64 MG/DL (ref 75–99)
GLUCOSE BLD-MCNC: 77 MG/DL (ref 75–99)
GLUCOSE BLD-MCNC: 83 MG/DL (ref 75–99)
GLUCOSE BLD-MCNC: 88 MG/DL (ref 75–99)
GLUCOSE SERPL-MCNC: 133 MG/DL (ref 74–99)
HCT VFR BLD AUTO: 27.1 % (ref 39–53)
HGB BLD-MCNC: 8.7 GM/DL (ref 13–17.5)
LYMPHOCYTES # SPEC AUTO: 0.3 K/UL (ref 1–4.8)
LYMPHOCYTES NFR SPEC AUTO: 5 %
MCH RBC QN AUTO: 30.3 PG (ref 25–35)
MCHC RBC AUTO-ENTMCNC: 32.2 G/DL (ref 31–37)
MCV RBC AUTO: 94.2 FL (ref 80–100)
MONOCYTES # BLD AUTO: 0.5 K/UL (ref 0–1)
MONOCYTES NFR BLD AUTO: 8 %
NEUTROPHILS # BLD AUTO: 5.5 K/UL (ref 1.3–7.7)
NEUTROPHILS NFR BLD AUTO: 86 %
PLATELET # BLD AUTO: 260 K/UL (ref 150–450)
POTASSIUM SERPL-SCNC: 3.4 MMOL/L (ref 3.5–5.1)
SODIUM SERPL-SCNC: 146 MMOL/L (ref 137–145)
WBC # BLD AUTO: 6.3 K/UL (ref 3.8–10.6)

## 2018-06-07 RX ADMIN — NAPROXEN SCH MG: 250 TABLET ORAL at 19:32

## 2018-06-07 RX ADMIN — MONTELUKAST SODIUM SCH MG: 10 TABLET, FILM COATED ORAL at 07:50

## 2018-06-07 RX ADMIN — LINAGLIPTIN SCH MG: 5 TABLET, FILM COATED ORAL at 07:50

## 2018-06-07 RX ADMIN — IPRATROPIUM BROMIDE AND ALBUTEROL SULFATE SCH ML: .5; 3 SOLUTION RESPIRATORY (INHALATION) at 08:34

## 2018-06-07 RX ADMIN — APIXABAN SCH MG: 5 TABLET, FILM COATED ORAL at 07:51

## 2018-06-07 RX ADMIN — IPRATROPIUM BROMIDE AND ALBUTEROL SULFATE SCH ML: .5; 3 SOLUTION RESPIRATORY (INHALATION) at 13:20

## 2018-06-07 RX ADMIN — BUDESONIDE AND FORMOTEROL FUMARATE DIHYDRATE SCH PUFF: 160; 4.5 AEROSOL RESPIRATORY (INHALATION) at 20:47

## 2018-06-07 RX ADMIN — GLIMEPIRIDE SCH MG: 2 TABLET ORAL at 07:50

## 2018-06-07 RX ADMIN — Medication SCH EACH: at 07:50

## 2018-06-07 RX ADMIN — APIXABAN SCH MG: 5 TABLET, FILM COATED ORAL at 19:30

## 2018-06-07 RX ADMIN — POTASSIUM CHLORIDE SCH MEQ: 750 TABLET, EXTENDED RELEASE ORAL at 07:50

## 2018-06-07 RX ADMIN — GLIMEPIRIDE SCH MG: 2 TABLET ORAL at 19:31

## 2018-06-07 RX ADMIN — PANTOPRAZOLE SODIUM SCH MG: 40 TABLET, DELAYED RELEASE ORAL at 06:10

## 2018-06-07 RX ADMIN — GABAPENTIN SCH MG: 100 CAPSULE ORAL at 16:49

## 2018-06-07 RX ADMIN — FUROSEMIDE SCH MG: 10 INJECTION, SOLUTION INTRAMUSCULAR; INTRAVENOUS at 07:49

## 2018-06-07 RX ADMIN — FUROSEMIDE SCH MG: 10 INJECTION, SOLUTION INTRAMUSCULAR; INTRAVENOUS at 19:30

## 2018-06-07 RX ADMIN — I-VITE, TAB 1000-60-2MG (60/BT) SCH EACH: TAB at 07:50

## 2018-06-07 RX ADMIN — LORATADINE SCH MG: 10 TABLET ORAL at 07:52

## 2018-06-07 RX ADMIN — NITROGLYCERIN SCH: 20 OINTMENT TOPICAL at 16:45

## 2018-06-07 RX ADMIN — NITROGLYCERIN SCH INCH: 20 OINTMENT TOPICAL at 07:49

## 2018-06-07 RX ADMIN — ATENOLOL SCH MG: 50 TABLET ORAL at 07:50

## 2018-06-07 RX ADMIN — INSULIN ASPART SCH: 100 INJECTION, SOLUTION INTRAVENOUS; SUBCUTANEOUS at 21:04

## 2018-06-07 RX ADMIN — TAMSULOSIN HYDROCHLORIDE SCH MG: 0.4 CAPSULE ORAL at 07:50

## 2018-06-07 RX ADMIN — ATORVASTATIN CALCIUM SCH MG: 20 TABLET, FILM COATED ORAL at 07:50

## 2018-06-07 RX ADMIN — NITROGLYCERIN SCH INCH: 20 OINTMENT TOPICAL at 19:32

## 2018-06-07 RX ADMIN — METHYLPREDNISOLONE SODIUM SUCCINATE SCH MG: 125 INJECTION, POWDER, FOR SOLUTION INTRAMUSCULAR; INTRAVENOUS at 06:10

## 2018-06-07 RX ADMIN — IPRATROPIUM BROMIDE AND ALBUTEROL SULFATE SCH ML: .5; 3 SOLUTION RESPIRATORY (INHALATION) at 16:56

## 2018-06-07 RX ADMIN — NITROGLYCERIN SCH INCH: 20 OINTMENT TOPICAL at 11:33

## 2018-06-07 RX ADMIN — INSULIN ASPART SCH: 100 INJECTION, SOLUTION INTRAVENOUS; SUBCUTANEOUS at 11:29

## 2018-06-07 RX ADMIN — INSULIN ASPART SCH UNIT: 100 INJECTION, SOLUTION INTRAVENOUS; SUBCUTANEOUS at 06:09

## 2018-06-07 RX ADMIN — NAPROXEN SCH MG: 250 TABLET ORAL at 07:51

## 2018-06-07 RX ADMIN — GABAPENTIN SCH MG: 100 CAPSULE ORAL at 19:31

## 2018-06-07 RX ADMIN — INSULIN DETEMIR SCH UNIT: 100 INJECTION, SOLUTION SUBCUTANEOUS at 07:58

## 2018-06-07 RX ADMIN — INSULIN ASPART SCH: 100 INJECTION, SOLUTION INTRAVENOUS; SUBCUTANEOUS at 16:54

## 2018-06-07 RX ADMIN — IPRATROPIUM BROMIDE AND ALBUTEROL SULFATE SCH ML: .5; 3 SOLUTION RESPIRATORY (INHALATION) at 20:47

## 2018-06-07 RX ADMIN — THEOPHYLLINE ANHYDROUS SCH MG: 300 CAPSULE, EXTENDED RELEASE ORAL at 07:50

## 2018-06-07 RX ADMIN — ASPIRIN 325 MG ORAL TABLET SCH MG: 325 PILL ORAL at 07:50

## 2018-06-07 RX ADMIN — BUDESONIDE AND FORMOTEROL FUMARATE DIHYDRATE SCH PUFF: 160; 4.5 AEROSOL RESPIRATORY (INHALATION) at 08:34

## 2018-06-07 NOTE — P.PN
Subjective


Progress Note Date: 06/07/18


Principal diagnosis: 





Acute on chronic hypoxic respiratory failure secondary to suspected pulmonary 

embolism and acute exacerbation of chronic obstructive pulmonary disease.





This is a very pleasant 84-year-old gentleman who follows with Dr. Villela as 

his primary care physician.  He has a history of hyperlipidemia, hypertension, 

coronary artery disease, congestive heart failure, diabetes mellitus, 

gastroesophageal reflux disease.  He also has a history of chronic obstructive 

pulmonary disease and was recently, in March 2018, started on home oxygen.  He 

was due to see Dr. Bridges in our office as a new patient this week.  He has been 

seen by a pulmonologist in [place] where he spends his l[place].  He is on 

Trelegy, theophylline, Singulair, albuterol in the outpatient setting.  He is 

unsure of his pulmonary function numbers.  He does have a history of prostate 

cancer diagnosed in August 2017.  He had undergone 25 external radiation 

treatments and subsequently sent to [place] for a 26 internal radiation with 

seed implants.  He states since that last treatment he has had lower extremity 

weakness and worsening shortness of breath.  He has been seen by a neurologist 

who states his lower extremity weakness is secondary to the radiation 

treatments.  Neuropathy.  Over the past several weeks he has also been having 

complaints of increasing swelling in the lower extremities and had been seen in 

[facility] treated with diuretics and released.  The swelling started 

again and he had been seen by Dr. Villela who at this time referred him here 

yesterday for further evaluation.  He is seen today in consultation on the 

selective care unit.  He is awake and alert in no acute distress.  He is 

dyspneic with minimal exertion and minimal conversation.  He states this is not 

new but has been getting progressively worse.  Troponins have been negative.  

ProBNP 1050.  Creatinine 1.82.  White count 2.9.  Hemoglobin 9.9.  D-dimer 

2.20.  Doppler studies of the lower extremities were negative for DVT 

bilaterally.  VQ scan revealed a moderate-sized effusion defect along the 

lateral right lung and a right basilar defect.  Read as intermediate 

probability for pulmonary embolism.  The patient is currently on a heparin 

drip.  He is also receiving Lasix 40 mg IV push every 12 hours.  He is 

currently maintaining good O2 saturations in the 90s on 3 L/m per nasal 

cannula.  He's been afebrile.  Hemodynamically stable.  Initiated on 

bronchodilators and IV Solu-Medrol. 





The patient is seen again today 06/06/2018 in follow-up on the selective care 

unit.  He is awake and alert in no acute distress.  He is up ambulating with 

physical therapy without any significant shortness of breath.  He is breathing 

easier today as compared to yesterday.  Maintaining O2 saturations in the 90s 

on 3 L/m per nasal cannula.  He's been afebrile.  Hemodynamically stable.  

White count 5.7.  Hemoglobin 9.0.  Creatinine 1.53.  Blood cultures reveal no 

growth to date.





The patient is seen again today 06/07/2018 in follow-up on the selective care 

unit.  He is awake and alert in no acute distress.  He is resting quite 

comfortably in bed.  He denies any worsening shortness of breath, cough or 

congestion.  No chest pain or palpitations.  Chest x-ray shows improvement in 

the interstitial edema.  His weight is stable at 85 kg.  White count 6.3.  

Hemoglobin 8.7.  Creatinine 1.45.  He has been transitioned to Eliquis.





Objective





- Vital Signs


Vital signs: 


 Vital Signs











Temp  97.6 F   06/07/18 08:00


 


Pulse  72   06/07/18 08:49


 


Resp  16   06/07/18 08:00


 


BP  126/66   06/07/18 08:00


 


Pulse Ox  96   06/07/18 08:00








 Intake & Output











 06/06/18 06/07/18 06/07/18





 18:59 06:59 18:59


 


Intake Total 950.217 960 240


 


Output Total  0 


 


Balance 950.217 960 240


 


Weight  85 kg 


 


Intake:   


 


  Intake, IV Titration 470.217  





  Amount   


 


    Heparin Sodium,Porcine/ 470.217  





    D5w Pmx 25,000 unit In   





    Dextrose/Water 1 500ml.   





    bag @ 18 UNITS/KG/HR 31.   





    35 mls/hr IV .[id number] [facility]   





    Rx#:[id number]   


 


  Oral 480 960 240


 


Output:   


 


  Urine  0 


 


Other:   


 


  Voiding Method  Urinal 


 


  # Voids  2 














- Exam





- Constitutional


General appearance: average body habitus, no acute distress





- EENT


Eyes: EOMI, PERRLA


ENT: hard of hearing


Ears: bilateral: normal





- Neck


Neck: normal ROM


Carotids: bilateral: upstroke normal


Thyroid: bilateral: normal size





- Respiratory


Respiratory: bilateral: Faint crackles in the posterior bases





- Cardiovascular


Rhythm: regular


Heart sounds: normal: S1, S2





- Gastrointestinal


General gastrointestinal: normal bowel sounds, no organomegaly, soft, no 

tenderness





- Neurologic


Neurologic: CNII-XII intact





- Musculoskeletal


Musculoskeletal: generalized weakness





- Psychiatric


Psychiatric: A&O x's 3, appropriate affect, intact judgment & insight








- Labs


CBC & Chem 7: 


 06/07/18 05:37





 06/07/18 05:37


Labs: 


 Abnormal Lab Results - Last 24 Hours (Table)











  06/06/18 06/06/18 06/06/18 Range/Units





  11:46 16:20 21:05 


 


RBC     (4.30-5.90)  m/uL


 


Hgb     (13.0-17.5)  gm/dL


 


Hct     (39.0-53.0)  %


 


RDW     (11.5-15.5)  %


 


Lymphocytes #     (1.0-4.8)  k/uL


 


Sodium     (137-145)  mmol/L


 


Potassium     (3.5-5.1)  mmol/L


 


Chloride     ()  mmol/L


 


Carbon Dioxide     (22-30)  mmol/L


 


BUN     (9-20)  mg/dL


 


Creatinine     (0.66-1.25)  mg/dL


 


Glucose     (74-99)  mg/dL


 


POC Glucose (mg/dL)  250 H  310 H  335 H  (75-99)  mg/dL


 


Calcium     (8.4-10.2)  mg/dL














  06/07/18 06/07/18 06/07/18 Range/Units





  05:37 05:37 05:55 


 


RBC  2.87 L    (4.30-5.90)  m/uL


 


Hgb  8.7 L    (13.0-17.5)  gm/dL


 


Hct  27.1 L    (39.0-53.0)  %


 


RDW  16.6 H    (11.5-15.5)  %


 


Lymphocytes #  0.3 L    (1.0-4.8)  k/uL


 


Sodium   146 H   (137-145)  mmol/L


 


Potassium   3.4 L   (3.5-5.1)  mmol/L


 


Chloride   96 L   ()  mmol/L


 


Carbon Dioxide   33 H   (22-30)  mmol/L


 


BUN   79 H   (9-20)  mg/dL


 


Creatinine   1.45 H   (0.66-1.25)  mg/dL


 


Glucose   133 H   (74-99)  mg/dL


 


POC Glucose (mg/dL)    144 H  (75-99)  mg/dL


 


Calcium   7.9 L   (8.4-10.2)  mg/dL








 Microbiology - Last 24 Hours (Table)











 06/04/18 17:27 Blood Culture - Preliminary





 Blood    No Growth after 48 hours














Assessment and Plan


Assessment: 





Impression:





#1 Acute on chronic hypoxic respiratory failure secondary to suspected 

pulmonary embolism as well as acute exacerbation of chronic obstructive 

pulmonary disease and possible acute on chronic diastolic congestive heart 

failure. 





#2 Oxygen dependent chronic obstructive pulmonary disease.





#3 History of chronic tobacco dependence.





#4 History of prostate cancer status post radiation with lower extremity 

neuropathy.





#5 Lower extremity edema and suspect secondary to congestive heart failure.





#6 History of coronary artery disease.





#7 Diabetes mellitus.





#8 Gastroesophageal reflux disease.





#9 Hyperlipidemia.





#10 Acute renal failure secondary to diuretics.





Plan:





The patient was seen and evaluated by Dr. Maria. We'll continue to treat him 

for COPD exacerbation.  Continue bronchodilators, Symbicort and transition from 

IV site Medrol to prednisone taper.  He has been initiated on Eliquis.  

Continue diuretics. We will continue to follow and make further recommendations 

based on his clinical status.





I, the cosigning physician, performed a history & physical examination of the 

patient. Lungs sounds have crackles in the posterior bases, diminished.  

Maintaining good O2 saturations in the 90s on 3 L/m per nasal cannula.  I 

discussed the assessment and plan of care with my nurse practitioner, Kalli Medel. I attest to the above note as dictated by her.

## 2018-06-08 VITALS — SYSTOLIC BLOOD PRESSURE: 138 MMHG | DIASTOLIC BLOOD PRESSURE: 73 MMHG | TEMPERATURE: 96.9 F

## 2018-06-08 VITALS — HEART RATE: 70 BPM

## 2018-06-08 LAB
ANION GAP SERPL CALC-SCNC: 14 MMOL/L
ANION GAP SERPL CALC-SCNC: 17 MMOL/L
BASOPHILS # BLD AUTO: 0 K/UL (ref 0–0.2)
BASOPHILS NFR BLD AUTO: 0 %
BUN SERPL-SCNC: 77 MG/DL (ref 9–20)
BUN SERPL-SCNC: 80 MG/DL (ref 9–20)
CALCIUM SPEC-MCNC: 7.9 MG/DL (ref 8.4–10.2)
CALCIUM SPEC-MCNC: 8.5 MG/DL (ref 8.4–10.2)
CHLORIDE SERPL-SCNC: 91 MMOL/L (ref 98–107)
CHLORIDE SERPL-SCNC: 94 MMOL/L (ref 98–107)
CO2 SERPL-SCNC: 33 MMOL/L (ref 22–30)
CO2 SERPL-SCNC: 38 MMOL/L (ref 22–30)
EOSINOPHIL # BLD AUTO: 0 K/UL (ref 0–0.7)
EOSINOPHIL NFR BLD AUTO: 0 %
ERYTHROCYTE [DISTWIDTH] IN BLOOD BY AUTOMATED COUNT: 3.13 M/UL (ref 4.3–5.9)
ERYTHROCYTE [DISTWIDTH] IN BLOOD: 16.8 % (ref 11.5–15.5)
GLUCOSE BLD-MCNC: 164 MG/DL (ref 75–99)
GLUCOSE BLD-MCNC: 64 MG/DL (ref 75–99)
GLUCOSE BLD-MCNC: 74 MG/DL (ref 75–99)
GLUCOSE SERPL-MCNC: 184 MG/DL (ref 74–99)
GLUCOSE SERPL-MCNC: 56 MG/DL (ref 74–99)
HCT VFR BLD AUTO: 29.7 % (ref 39–53)
HGB BLD-MCNC: 9.6 GM/DL (ref 13–17.5)
LYMPHOCYTES # SPEC AUTO: 0.4 K/UL (ref 1–4.8)
LYMPHOCYTES NFR SPEC AUTO: 8 %
MAGNESIUM SPEC-SCNC: 2.1 MG/DL (ref 1.6–2.3)
MCH RBC QN AUTO: 30.6 PG (ref 25–35)
MCHC RBC AUTO-ENTMCNC: 32.2 G/DL (ref 31–37)
MCV RBC AUTO: 94.9 FL (ref 80–100)
MONOCYTES # BLD AUTO: 0.4 K/UL (ref 0–1)
MONOCYTES NFR BLD AUTO: 7 %
NEUTROPHILS # BLD AUTO: 4.2 K/UL (ref 1.3–7.7)
NEUTROPHILS NFR BLD AUTO: 83 %
PLATELET # BLD AUTO: 252 K/UL (ref 150–450)
POTASSIUM SERPL-SCNC: 2.9 MMOL/L (ref 3.5–5.1)
POTASSIUM SERPL-SCNC: 4 MMOL/L (ref 3.5–5.1)
SODIUM SERPL-SCNC: 141 MMOL/L (ref 137–145)
SODIUM SERPL-SCNC: 146 MMOL/L (ref 137–145)
WBC # BLD AUTO: 5.1 K/UL (ref 3.8–10.6)

## 2018-06-08 RX ADMIN — IPRATROPIUM BROMIDE AND ALBUTEROL SULFATE SCH: .5; 3 SOLUTION RESPIRATORY (INHALATION) at 11:13

## 2018-06-08 RX ADMIN — ASPIRIN 325 MG ORAL TABLET SCH MG: 325 PILL ORAL at 08:33

## 2018-06-08 RX ADMIN — MAGNESIUM SULFATE IN DEXTROSE SCH MLS/HR: 10 INJECTION, SOLUTION INTRAVENOUS at 12:33

## 2018-06-08 RX ADMIN — PANTOPRAZOLE SODIUM SCH MG: 40 TABLET, DELAYED RELEASE ORAL at 08:33

## 2018-06-08 RX ADMIN — I-VITE, TAB 1000-60-2MG (60/BT) SCH EACH: TAB at 08:33

## 2018-06-08 RX ADMIN — GABAPENTIN SCH MG: 100 CAPSULE ORAL at 08:31

## 2018-06-08 RX ADMIN — APIXABAN SCH MG: 5 TABLET, FILM COATED ORAL at 08:31

## 2018-06-08 RX ADMIN — NITROGLYCERIN SCH: 20 OINTMENT TOPICAL at 12:33

## 2018-06-08 RX ADMIN — Medication SCH EACH: at 08:32

## 2018-06-08 RX ADMIN — POTASSIUM CHLORIDE SCH MEQ: 20 TABLET, EXTENDED RELEASE ORAL at 10:24

## 2018-06-08 RX ADMIN — MAGNESIUM SULFATE IN DEXTROSE SCH MLS/HR: 10 INJECTION, SOLUTION INTRAVENOUS at 11:27

## 2018-06-08 RX ADMIN — NITROGLYCERIN SCH: 20 OINTMENT TOPICAL at 08:44

## 2018-06-08 RX ADMIN — NITROGLYCERIN SCH INCH: 20 OINTMENT TOPICAL at 08:31

## 2018-06-08 RX ADMIN — ATENOLOL SCH MG: 50 TABLET ORAL at 08:36

## 2018-06-08 RX ADMIN — INSULIN DETEMIR SCH UNIT: 100 INJECTION, SOLUTION SUBCUTANEOUS at 08:35

## 2018-06-08 RX ADMIN — INSULIN ASPART SCH: 100 INJECTION, SOLUTION INTRAVENOUS; SUBCUTANEOUS at 07:21

## 2018-06-08 RX ADMIN — THEOPHYLLINE ANHYDROUS SCH MG: 300 CAPSULE, EXTENDED RELEASE ORAL at 08:32

## 2018-06-08 RX ADMIN — NAPROXEN SCH MG: 250 TABLET ORAL at 08:32

## 2018-06-08 RX ADMIN — POTASSIUM CHLORIDE SCH MEQ: 20 TABLET, EXTENDED RELEASE ORAL at 09:50

## 2018-06-08 RX ADMIN — MAGNESIUM SULFATE IN DEXTROSE SCH MLS/HR: 10 INJECTION, SOLUTION INTRAVENOUS at 10:32

## 2018-06-08 RX ADMIN — INSULIN ASPART SCH UNIT: 100 INJECTION, SOLUTION INTRAVENOUS; SUBCUTANEOUS at 13:13

## 2018-06-08 RX ADMIN — ATORVASTATIN CALCIUM SCH MG: 20 TABLET, FILM COATED ORAL at 08:36

## 2018-06-08 RX ADMIN — LINAGLIPTIN SCH MG: 5 TABLET, FILM COATED ORAL at 08:34

## 2018-06-08 RX ADMIN — GLIMEPIRIDE SCH MG: 2 TABLET ORAL at 08:33

## 2018-06-08 RX ADMIN — MONTELUKAST SODIUM SCH MG: 10 TABLET, FILM COATED ORAL at 08:34

## 2018-06-08 RX ADMIN — TAMSULOSIN HYDROCHLORIDE SCH MG: 0.4 CAPSULE ORAL at 08:31

## 2018-06-08 RX ADMIN — IPRATROPIUM BROMIDE AND ALBUTEROL SULFATE SCH ML: .5; 3 SOLUTION RESPIRATORY (INHALATION) at 07:37

## 2018-06-08 RX ADMIN — BUDESONIDE AND FORMOTEROL FUMARATE DIHYDRATE SCH PUFF: 160; 4.5 AEROSOL RESPIRATORY (INHALATION) at 07:38

## 2018-06-08 RX ADMIN — LORATADINE SCH MG: 10 TABLET ORAL at 08:34

## 2018-06-08 RX ADMIN — POTASSIUM CHLORIDE SCH MEQ: 750 TABLET, EXTENDED RELEASE ORAL at 08:31

## 2018-06-08 RX ADMIN — POTASSIUM CHLORIDE SCH MEQ: 20 TABLET, EXTENDED RELEASE ORAL at 12:32

## 2018-06-08 NOTE — P.PN
Subjective


Progress Note Date: 06/08/18


Principal diagnosis: 


Acute on chronic hypoxic respiratory failure secondary to suspected pulmonary 

embolism and acute exacerbation of chronic obstructive pulmonary disease





This is a very pleasant 84-year-old gentleman who follows with Dr. Villela as 

his primary care physician.  He has a history of hyperlipidemia, hypertension, 

coronary artery disease, congestive heart failure, diabetes mellitus, 

gastroesophageal reflux disease.  He also has a history of chronic obstructive 

pulmonary disease and was recently, in March 2018, started on home oxygen.  He 

was due to see Dr. Bridges in our office as a new patient this week.  He has been 

seen by a pulmonologist in [place] where he spends his lira.  He is on 

Trelegy, theophylline, Singulair, albuterol in the outpatient setting.  He is 

unsure of his pulmonary function numbers.  He does have a history of prostate 

cancer diagnosed in August 2017.  He had undergone 25 external radiation 

treatments and subsequently sent to [facility] for a 26 internal radiation with 

seed implants.  He states since that last treatment he has had lower extremity 

weakness and worsening shortness of breath.  He has been seen by a neurologist 

who states his lower extremity weakness is secondary to the radiation 

treatments.  Neuropathy.  Over the past several weeks he has also been having 

complaints of increasing swelling in the lower extremities and had been seen in 

[facility] treated with diuretics and released.  The swelling started 

again and he had been seen by Dr. Villela who at this time referred him here 

yesterday for further evaluation.  He is seen today in consultation on the 

selective care unit.  He is awake and alert in no acute distress.  He is 

dyspneic with minimal exertion and minimal conversation.  He states this is not 

new but has been getting progressively worse.  Troponins have been negative.  

ProBNP 1050.  Creatinine 1.82.  White count 2.9.  Hemoglobin 9.9.  D-dimer 

2.20.  Doppler studies of the lower extremities were negative for DVT 

bilaterally.  VQ scan revealed a moderate-sized effusion defect along the 

lateral right lung and a right basilar defect.  Read as intermediate 

probability for pulmonary embolism.  The patient is currently on a heparin 

drip.  He is also receiving Lasix 40 mg IV push every 12 hours.  He is 

currently maintaining good O2 saturations in the 90s on 3 L/m per nasal 

cannula.  He's been afebrile.  Hemodynamically stable.  Initiated on 

bronchodilators and IV Solu-Medrol. 





The patient is seen again today 06/06/2018 in follow-up on the selective care 

unit.  He is awake and alert in no acute distress.  He is up ambulating with 

physical therapy without any significant shortness of breath.  He is breathing 

easier today as compared to yesterday.  Maintaining O2 saturations in the 90s 

on 3 L/m per nasal cannula.  He's been afebrile.  Hemodynamically stable.  

White count 5.7.  Hemoglobin 9.0.  Creatinine 1.53.  Blood cultures reveal no 

growth to date.





The patient is seen again today 06/07/2018 in follow-up on the selective care 

unit.  He is awake and alert in no acute distress.  He is resting quite 

comfortably in bed.  He denies any worsening shortness of breath, cough or 

congestion.  No chest pain or palpitations.  Chest x-ray shows improvement in 

the interstitial edema.  His weight is stable at 85 kg.  White count 6.3.  

Hemoglobin 8.7.  Creatinine 1.45.  He has been transitioned to Eliquis.





On 06 only 2018 patient seen in follow-up on medical surgical floor.  He denies 

any dyspnea, denies any chest pain, pulse ox on 3 L per nasal cannula is 99%, 

vital signs are stable, afebrile.  IV diuretics have been transitioned to oral, 

patient's weight is down 1 kg in last 24 hours.  His lower extremity edema is 

improving.  Patient states he has been ambulating in the room, and tolerated 

activity well.  He is on oral Eliquis for a possibility of pulmonary embolism.  

IV steroids have been switched to oral prednisone, remains stable from 

pulmonary standpoint, and discharge home is pending today. 








Objective





- Vital Signs


Vital signs: 


 Vital Signs











Temp  96.9 F L  06/08/18 06:00


 


Pulse  70   06/08/18 07:48


 


Resp  18   06/08/18 06:00


 


BP  138/73   06/08/18 06:00


 


Pulse Ox  99   06/08/18 06:00








 Intake & Output











 06/07/18 06/08/18 06/08/18





 18:59 06:59 18:59


 


Intake Total 840 240 


 


Output Total  0 


 


Balance 840 240 


 


Weight  84 kg 


 


Intake:   


 


  Oral 840 240 


 


Output:   


 


  Urine  0 


 


Other:   


 


  Voiding Method Urinal Urinal 


 


  # Voids 2 2 














- Exam


- Constitutional


General appearance: average body habitus, no acute distress





- EENT


Eyes: EOMI, PERRLA


ENT: hard of hearing


Ears: bilateral: normal





- Neck


Neck: normal ROM


Carotids: bilateral: upstroke normal


Thyroid: bilateral: normal size





- Respiratory


Respiratory: bilateral: Faint crackles in the posterior bases





- Cardiovascular


Rhythm: regular


Heart sounds: normal: S1, S2





- Gastrointestinal


General gastrointestinal: normal bowel sounds, no organomegaly, soft, no 

tenderness





- Neurologic


Neurologic: CNII-XII intact





- Musculoskeletal


Musculoskeletal: generalized weakness





- Psychiatric


Psychiatric: A&O x's 3, appropriate affect, intact judgment & insight











- Labs


CBC & Chem 7: 


 06/08/18 07:20





 06/08/18 07:20


Labs: 


 Abnormal Lab Results - Last 24 Hours (Table)











  06/07/18 06/07/18 06/08/18 Range/Units





  16:51 21:01 06:59 


 


RBC     (4.30-5.90)  m/uL


 


Hgb     (13.0-17.5)  gm/dL


 


Hct     (39.0-53.0)  %


 


RDW     (11.5-15.5)  %


 


Lymphocytes #     (1.0-4.8)  k/uL


 


Sodium     (137-145)  mmol/L


 


Potassium     (3.5-5.1)  mmol/L


 


Chloride     ()  mmol/L


 


Carbon Dioxide     (22-30)  mmol/L


 


BUN     (9-20)  mg/dL


 


Creatinine     (0.66-1.25)  mg/dL


 


Glucose     (74-99)  mg/dL


 


POC Glucose (mg/dL)  64 L  59 L  64 L  (75-99)  mg/dL


 


Calcium     (8.4-10.2)  mg/dL


 


Magnesium     (1.6-2.3)  mg/dL














  06/08/18 06/08/18 06/08/18 Range/Units





  07:18 07:20 07:20 


 


RBC   3.13 L   (4.30-5.90)  m/uL


 


Hgb   9.6 L   (13.0-17.5)  gm/dL


 


Hct   29.7 L   (39.0-53.0)  %


 


RDW   16.8 H   (11.5-15.5)  %


 


Lymphocytes #   0.4 L   (1.0-4.8)  k/uL


 


Sodium    146 H  (137-145)  mmol/L


 


Potassium    2.9 L*  (3.5-5.1)  mmol/L


 


Chloride    94 L  ()  mmol/L


 


Carbon Dioxide    38 H  (22-30)  mmol/L


 


BUN    80 H*  (9-20)  mg/dL


 


Creatinine    1.60 H  (0.66-1.25)  mg/dL


 


Glucose    56 L  (74-99)  mg/dL


 


POC Glucose (mg/dL)  74 L    (75-99)  mg/dL


 


Calcium    7.9 L  (8.4-10.2)  mg/dL


 


Magnesium     (1.6-2.3)  mg/dL














  06/08/18 Range/Units





  07:20 


 


RBC   (4.30-5.90)  m/uL


 


Hgb   (13.0-17.5)  gm/dL


 


Hct   (39.0-53.0)  %


 


RDW   (11.5-15.5)  %


 


Lymphocytes #   (1.0-4.8)  k/uL


 


Sodium   (137-145)  mmol/L


 


Potassium   (3.5-5.1)  mmol/L


 


Chloride   ()  mmol/L


 


Carbon Dioxide   (22-30)  mmol/L


 


BUN   (9-20)  mg/dL


 


Creatinine   (0.66-1.25)  mg/dL


 


Glucose   (74-99)  mg/dL


 


POC Glucose (mg/dL)   (75-99)  mg/dL


 


Calcium   (8.4-10.2)  mg/dL


 


Magnesium  1.1 L  (1.6-2.3)  mg/dL








 Microbiology - Last 24 Hours (Table)











 06/04/18 17:27 Blood Culture - Preliminary





 Blood    No Growth after 72 hours














Assessment and Plan


Plan: 


Assessment:





#1 Acute on chronic hypoxic respiratory failure secondary to suspected 

pulmonary embolism as well as acute exacerbation of chronic obstructive 

pulmonary disease and possible acute on chronic diastolic congestive heart 

failure. 





#2 Oxygen dependent chronic obstructive pulmonary disease.





#3 History of chronic tobacco dependence.





#4 History of prostate cancer status post radiation with lower extremity 

neuropathy.





#5 Lower extremity edema and suspect secondary to congestive heart failure.





#6 History of coronary artery disease.





#7 Diabetes mellitus.





#8 Gastroesophageal reflux disease.





#9 Hyperlipidemia.





#10 Acute renal failure secondary to diuretics.





Plan:





Patient continues to improve, breathing easier, denies any dyspnea or chest 

pain.  Has been diuresed, and treated for his COPD exacerbation with inhaled 

bronchodilators, steroids, and has responded well to treatment, started on 

Eliquis.  Overall is stable from pulmonary standpoint, and ready for discharge 

home today.  Follow-up with Dr. Bridges in the office in 7-10 days





I performed a history & physical examination of the patient and discussed their 

management with my nurse practitioner, Pinky Smith.  I reviewed the nurse 

practitioner's note and agree with the documented findings and plan of care.  

Lung sounds are positive for bibasilar crackles.  The findings and the 

impression was discussed with the patient.  I attest to the documentation by 

the nurse practitioner. 





Time with Patient: Less than 30

## 2018-06-17 NOTE — P.DS
Providers


Date of admission: 


06/04/18 19:46





Expected date of discharge: 06/08/18


Attending physician: 


Amaury Gale MD





Consults: 





 





06/04/18 19:42


Consult Physician Routine 


   Consulting Provider: Ludmila Mcadams


   Consult Reason/Comments: sob, chf


   Do you want consulting provider notified?: Yes





06/05/18 00:35


Consult Physician Routine 


   Consulting Provider: Clarissa Maria


   Consult Reason/Comments: copd


   Do you want consulting provider notified?: Yes











Primary care physician: 


Fadi Villela





[facility] Course: 





Discharge Diagnosis


Shortness of breath secondary to acute COPD exacerbation and also acute 

pulmonary embolism


Acute on chronic hypoxic respiratory failure secondary to above


Elevated d-dimer with moderate probability for PE on VQ scan


Unlikely acute CHF with normal EF and also BNP is not elevated


Acute on chronic kidney disease stage III


Hypertension


Hyperlipidemia


Diabetes type 2


GERD


Prostate cancer status post external and internal radiation


History of MI


CODE STATUS is DO NOT RESUSCITATE/DO NOT INTUBATE





Hospital course.


Patient is a 84-year-old male with a known history of hypertension, 

hyperlipidemia, coronary artery disease, diabetes type 2, COPD on home oxygen 

started on recently in March 2018 and also prostate cancer diagnosed in August 2017 status post 25 radiation treatments and subsequently seen at [facility] for a 26 internal radiation with seed implants.  Patient presented to 

ER with worsening shortness of breath and also lower exudate weakness as well 

as swelling on admission.


Patient was seen at [facility] for bilateral lower swelling and was 

treated with diuretics.  Patient was seen by Dr. Villela and referred him to ER 

for further radiation or shortness of breath.


D-dimer 2.2 and VQ scan showed moderate probably for PE.  Patient was started 

on heparin drip.


Patient also receiving Lasix 40 mg twice a day.  BNP 1050.  Troponin 3 

negative.  Creatinine 1.82 admission.


Patient is being followed by pulmonary and cardiology.  2-D echo cardiac exam 

showed normal EF.





On 06/05/2018


Patient is pretty status is improving at this time.  He is being continued on 

heparin drip due to moderate possible for PE.  CTA chest could not be done due 

to elevated creatinine level.  Due to the fact that patient has been having 

worsening shortness of breath recently and also history of prostate cancer, he'

ll be continued on antibiotic regulation at this time.  Patient is also on 

Lasix.  Cardiology and pulmonary is following.  Unlikely acute CHF.


Patient is being continued on breathing treatments and steroids.


No nausea vomiting or abdominal pain.  No fever no chills.  No chest pain or 

worsening shortness of breath.





06/06/2018


Patient's breathing status is much improved now.  Improved air entry bilateral 

lung fields.  Patient is being continued on heparin drip and will be 

transitioned to oral anticoagulation.  Patient recovered on Lasix.  Chest x-ray 

showed improvement in interstitial edema.  Renal function improved with 

creatinine level I.5


Otherwise patient is improving overall.  Patient is able to saturate well on 

nasal cannula.  No chest pain.  No fever no chills.  No acute overnight issues.





6/7/18:


He is awake and alert in no acute distress.  He is resting quite comfortably in 

bed.  He denies any worsening shortness of breath, cough or congestion.  No 

chest pain or palpitations.  Chest x-ray shows improvement in the interstitial 

edema.  His weight is stable at 85 kg.  White count 6.3.  Hemoglobin 8.7.  

Creatinine 1.45.  He has been transitioned to Eliquis.





6/8/18


Pt. did improve clinically. continued with Eliquis. c/w prednisone taper. 

stable to be discharged home. 





Plan:


Patient was continued on heparin drip and changed to oral anticoagulation.  

Continued on Lasix and aspirin, atenolol..  Patient as continued on breathing 

treatments and IV steroids. chnaged oral prednisone. Pulmonary and cardiology 

has seen the pt. Prognosis is guarded with multiple medical problems and 

comorbid conditions.





PHYSICAL EXAMINATION: 


Patient is lying in the bed comfortably, no acute distress, awake alert and 

oriented.. 


HEENT: Normocephalic. Neck is supple. Pupils reactive. Nostrils clear. Oral 

cavity is moist. Ears reveal no drainage. 


Neck reveals no JVD, carotid bruits, or thyromegaly. 


CHEST EXAMINATION: Trachea is central. Symmetrical expansion. Lung fields clear 

to auscultation and percussion. 


CARDIAC: Normal S1, S2 with no gallops. No murmurs 


ABDOMEN: Soft. Bowel sounds normal. No organomegaly. No abdominal bruits. 


Extremities: Bilateral lower extremity trace edema.  Venostasis changes.  No 

clubbing or cyanosis


Neurologically awake, alert, oriented x3 with well-coordinated movements.  No 

focal deficits noted


Skin: No rash or skin lesions. 


Psychiatric: Cooperative.  Nonsuicidal


Musculoskeletal: No joint swelling or deformity.  Normal range of motion.





Vitals reviewed.


Patient Condition at Discharge: Fair





Plan - Discharge Summary


Discharge Rx Participant: No


New Discharge Prescriptions: 


New


   Ipratropium-Albuterol Nebulize [Duoneb 0.5 mg-3 mg/3 ml Soln] 3 ml 

INHALATION RT-QID PRN  ampul.neb


     PRN Reason: Shortness Of Breath Or Wheezing


   Potassium Chloride ER [K-Dur 20] 20 meq PO DAILY #30 tab.er.prt


   Apixaban [Eliquis] 10 mg PO BID 5 Days #10 tab


   Apixaban [Eliquis] 5 mg PO BID #60 tab


   Furosemide [Lasix] 40 mg PO BID@0900,1600 #60 tab


   predniSONE See Taper PO DAILY #17 tab





Continue


   Vitamin B Complex 1 cap PO DAILY


   Tamsulosin [Flomax] 0.4 mg PO DAILY


   Vit C/E/Zn/Coppr/Lutein/Zeaxan [Preservision Areds 2 Softgel] 1 cap PO DAILY


   Theophylline 24 Hour [Matt-24] 300 mg PO DAILY


   Montelukast [Singulair] 10 mg PO DAILY


   Fexofenadine HCl [Allegra Allergy] 180 mg PO DAILY


   Omeprazole 20 mg PO DAILY


   Atenolol [Tenormin] 50 mg PO DAILY


   sitaGLIPtin PHOS/metFORMIN HCL [Janumet Xr 100-1,000 mg Tablet] 1 tab PO 

DAILY


   Glimepiride [Amaryl] 2 mg PO BID


   Fluticasone/Umeclidin/Vilanter [Trelegy Ellipta 100-62.5-25] 1 puff 

INHALATION RT-DAILY


   Atorvastatin [Lipitor] 20 mg PO DAILY





Discontinued


   Potassium Chloride ER [K-Dur 10] 10 meq PO DAILY


   Furosemide [Lasix] 20 mg PO DAILY


   Naproxen 500 mg PO BID


   Metolazone [Zaroxolyn] 5 mg PO DAILY


   Cetirizine HCl [Zyrtec] 10 mg PO DAILY


Discharge Medication List





Atenolol [Tenormin] 50 mg PO DAILY 06/04/18 [History]


Atorvastatin [Lipitor] 20 mg PO DAILY 06/04/18 [History]


Fexofenadine HCl [Allegra Allergy] 180 mg PO DAILY 06/04/18 [History]


Fluticasone/Umeclidin/Vilanter [Trelegy Ellipta 100-62.5-25] 1 puff INHALATION 

RT-DAILY 06/04/18 [History]


Glimepiride [Amaryl] 2 mg PO BID 06/04/18 [History]


Montelukast [Singulair] 10 mg PO DAILY 06/04/18 [History]


Omeprazole 20 mg PO DAILY 06/04/18 [History]


Tamsulosin [Flomax] 0.4 mg PO DAILY 06/04/18 [History]


Theophylline 24 Hour [Matt-24] 300 mg PO DAILY 06/04/18 [History]


Vit C/E/Zn/Coppr/Lutein/Zeaxan [Preservision Areds 2 Softgel] 1 cap PO DAILY 06/ 04/18 [History]


Vitamin B Complex 1 cap PO DAILY 06/04/18 [History]


sitaGLIPtin PHOS/metFORMIN HCL [Janumet Xr 100-1,000 mg Tablet] 1 tab PO DAILY 

06/04/18 [History]


Apixaban [Eliquis] 5 mg PO BID #60 tab 06/08/18 [Rx]


Apixaban [Eliquis] 10 mg PO BID 5 Days #10 tab 06/08/18 [Rx]


Furosemide [Lasix] 40 mg PO BID@0900,1600 #60 tab 06/08/18 [Rx]


Ipratropium-Albuterol Nebulize [Duoneb 0.5 mg-3 mg/3 ml Soln] 3 ml INHALATION RT

-QID PRN  ampul.neb 06/08/18 [Rx]


Potassium Chloride ER [K-Dur 20] 20 meq PO DAILY #30 tab.er.prt 06/08/18 [Rx]


predniSONE See Taper PO DAILY #17 tab 06/08/18 [Rx]








Follow up Appointment(s)/Referral(s): 


Clarissa Maria MD [STAFF PHYSICIAN] - 06/14/18 1:00 pm


Fadi Villela MD [Primary Care Provider] - 06/14/18 3:00 pm


Patient Instructions/Handouts:  Heart Failure (DC), Pulmonary Embolism (DC), 

Type 2 Diabetes in Adults (DC), COPD (Chronic Obstructive Pulmonary Disease) (DC

), Safe Use of Anticoagulants (DC)


Discharge Disposition: HOME SELF-CARE

## 2018-06-17 NOTE — P.PN
Subjective


Progress Note Date: 06/07/18


Principal diagnosis: 





Acute COPD exacerbation and pulmonary embolism





Patient is a 84-year-old male with a known history of hypertension, 

hyperlipidemia, coronary artery disease, diabetes type 2, COPD on home oxygen 

started on recently in March 2018 and also prostate cancer diagnosed in August 2017 status post 25 radiation treatments and subsequently seen at [facility] for a 26 internal radiation with seed implants.  Patient presented to 

ER with worsening shortness of breath and also lower exudate weakness as well 

as swelling on admission.


Patient was seen at [facility] for bilateral lower swelling and was 

treated with diuretics.  Patient was seen by Dr. Villela and referred him to ER 

for further radiation or shortness of breath.


D-dimer 2.2 and VQ scan showed moderate probably for PE.  Patient was started 

on heparin drip.


Patient also receiving Lasix 40 mg twice a day.  BNP 1050.  Troponin 3 

negative.  Creatinine 1.82 admission.


Patient is being followed by pulmonary and cardiology.  2-D echo cardiac exam 

showed normal EF.





On 06/05/2018


Patient is pretty status is improving at this time.  He is being continued on 

heparin drip due to moderate possible for PE.  CTA chest could not be done due 

to elevated creatinine level.  Due to the fact that patient has been having 

worsening shortness of breath recently and also history of prostate cancer, he'

ll be continued on antibiotic regulation at this time.  Patient is also on 

Lasix.  Cardiology and pulmonary is following.  Unlikely acute CHF.


Patient is being continued on breathing treatments and steroids.


No nausea vomiting or abdominal pain.  No fever no chills.  No chest pain or 

worsening shortness of breath.





06/06/2018


Patient's breathing status is much improved now.  Improved air entry bilateral 

lung fields.  Patient is being continued on heparin drip and will be 

transitioned to oral anticoagulation.  Patient recovered on Lasix.  Chest x-ray 

showed improvement in interstitial edema.  Renal function improved with 

creatinine level I.5


Otherwise patient is improving overall.  Patient is able to saturate well on 

nasal cannula.  No chest pain.  No fever no chills.  No acute overnight issues.





6/7/18:


He is awake and alert in no acute distress.  He is resting quite comfortably in 

bed.  He denies any worsening shortness of breath, cough or congestion.  No 

chest pain or palpitations.  Chest x-ray shows improvement in the interstitial 

edema.  His weight is stable at 85 kg.  White count 6.3.  Hemoglobin 8.7.  

Creatinine 1.45.  He has been transitioned to Eliquis.








All other review of systems negative except the above





Current medications reviewed.





Active Medications











Generic Name Dose Route Start Last Admin





  Trade Name Freq  PRN Reason Stop Dose Admin


 


Albuterol/Ipratropium  3 ml  06/05/18 01:29  





  Duoneb 0.5 Mg-3 Mg/3 Ml Soln  INHALATION   





  RT-QID PRN   





  Shortness Of Breath Or Wheezing   


 


Albuterol/Ipratropium  3 ml  06/05/18 08:00  06/06/18 15:54





  Duoneb 0.5 Mg-3 Mg/3 Ml Soln  INHALATION   3 ml





  RT-QID ALFREDA   Administration


 


Apixaban  10 mg  06/06/18 21:00  





  Eliquis  PO  06/13/18 21:01  





  BID ALFREDA   


 


Aspirin  325 mg  06/05/18 09:00  06/06/18 07:52





  Aspirin  PO   325 mg





  DAILY ALFREDA   Administration


 


Atenolol  50 mg  06/05/18 09:00  06/06/18 07:52





  Tenormin  PO   50 mg





  DAILY ALFREDA   Administration


 


Atorvastatin Calcium  20 mg  06/05/18 09:00  06/06/18 07:52





  Lipitor  PO   20 mg





  DAILY ALFREDA   Administration


 


Budesonide/Formoterol Fumarate  2 puff  06/05/18 20:00  06/06/18 08:55





  Symbicort 160-4.5 Mcg Inhaler  INHALATION   2 puff





  RT-BID ALFREDA   Administration


 


Furosemide  40 mg  06/04/18 21:00  06/06/18 07:53





  Lasix  IV   40 mg





  Q12HR ALFREDA   Administration


 


Glimepiride  2 mg  06/04/18 21:00  06/06/18 07:53





  Amaryl  PO   2 mg





  BID ALFREDA   Administration


 


Insulin Aspart  0 unit  06/04/18 21:00  06/06/18 16:23





  Novolog  SQ   8 unit





  ACHS ALFREDA   Administration





  Protocol   


 


Insulin Detemir  10 unit  06/05/18 12:00  06/06/18 08:10





  Levemir  SQ   10 unit





  DAILY ALFREDA   Administration


 


Linagliptin  5 mg  06/05/18 09:00  06/06/18 07:53





  Tradjenta  PO   5 mg





  DAILY ALFREDA   Administration


 


Loratadine  10 mg  06/05/18 09:00  06/06/18 07:54





  Claritin  PO   10 mg





  DAILY ALFREDA   Administration


 


Metformin HCl  500 mg  06/04/18 21:00  06/06/18 07:54





  Glucophage  PO   500 mg





  BID ALFREDA   Administration


 


Methylprednisolone Sodium Succinate  60 mg  06/05/18 14:00  06/06/18 16:24





  Solu-Medrol  IV   60 mg





  Q6HR ALFREDA   Administration


 


Montelukast Sodium  10 mg  06/05/18 09:00  06/06/18 07:55





  Singulair  PO   10 mg





  DAILY ALFREDA   Administration


 


Multivitamins/Minerals  1 each  06/05/18 09:00  06/06/18 07:56





  Ivite  PO   1 each





  DAILY ALFREDA   Administration


 


Naproxen  500 mg  06/04/18 21:00  06/06/18 07:54





  Naprosyn  PO   500 mg





  BID ALFREDA   Administration


 


Nitroglycerin  1 inch  06/04/18 22:00  06/06/18 16:25





  Nitro-Bid Oint  TOPICAL   1 inch





  QID ALFREDA   Administration


 


Pantoprazole Sodium  40 mg  06/05/18 07:30  06/06/18 06:23





  Protonix  PO   40 mg





  AC-BRKFST ALFREDA   Administration


 


Potassium Chloride  10 meq  06/05/18 09:00  06/06/18 07:55





  K-Dur 10  PO   10 meq





  DAILY ALFREDA   Administration


 


Tamsulosin HCl  0.4 mg  06/05/18 09:00  06/06/18 07:55





  Flomax  PO   0.4 mg





  DAILY ALFREDA   Administration


 


Theophylline  300 mg  06/05/18 09:00  06/06/18 07:55





  Matt-24  PO   300 mg





  DAILY ALFREDA   Administration


 


Vitamin B Complex/Vit C/Vit E/Zinc  1 each  06/05/18 09:00  06/06/18 07:53





  Z-Bec  PO   1 each





  DAILY ALFREDA   Administration














Objective





- Vital Signs


Vital signs: 


 Vital Signs











Temp  97.4 F L  06/07/18 20:00


 


Pulse  71   06/07/18 21:00


 


Resp  18   06/07/18 20:00


 


BP  140/69   06/07/18 20:00


 


Pulse Ox  95   06/07/18 20:00








 Intake & Output











 06/07/18 06/07/18 06/08/18





 06:59 18:59 06:59


 


Intake Total 960 840 240


 


Output Total 0  0


 


Balance 960 840 240


 


Weight 85 kg  85 kg


 


Intake:   


 


  Oral 960 840 240


 


Output:   


 


  Urine 0  0


 


Other:   


 


  Voiding Method Urinal Urinal Urinal


 


  # Voids 2 2 3














- Exam





PHYSICAL EXAMINATION: 


Patient is lying in the bed comfortably, no acute distress, awake alert and 

oriented.. 


HEENT: Normocephalic. Neck is supple. Pupils reactive. Nostrils clear. Oral 

cavity is moist. Ears reveal no drainage. 


Neck reveals no JVD, carotid bruits, or thyromegaly. 


CHEST EXAMINATION: Trachea is central. Symmetrical expansion. Lung fields clear 

to auscultation and percussion. 


CARDIAC: Normal S1, S2 with no gallops. No murmurs 


ABDOMEN: Soft. Bowel sounds normal. No organomegaly. No abdominal bruits. 


Extremities: Bilateral lower extremity trace edema.  Venostasis changes.  No 

clubbing or cyanosis


Neurologically awake, alert, oriented x3 with well-coordinated movements.  No 

focal deficits noted


Skin: No rash or skin lesions. 


Psychiatric: Cooperative.  Nonsuicidal


Musculoskeletal: No joint swelling or deformity.  Normal range of motion.








- Labs


CBC & Chem 7: 


 06/08/18 07:20





 06/08/18 13:57


Labs: 


 Abnormal Lab Results - Last 24 Hours (Table)











  06/07/18 06/07/18 06/07/18 Range/Units





  05:37 05:37 05:55 


 


RBC  2.87 L    (4.30-5.90)  m/uL


 


Hgb  8.7 L    (13.0-17.5)  gm/dL


 


Hct  27.1 L    (39.0-53.0)  %


 


RDW  16.6 H    (11.5-15.5)  %


 


Lymphocytes #  0.3 L    (1.0-4.8)  k/uL


 


Sodium   146 H   (137-145)  mmol/L


 


Potassium   3.4 L   (3.5-5.1)  mmol/L


 


Chloride   96 L   ()  mmol/L


 


Carbon Dioxide   33 H   (22-30)  mmol/L


 


BUN   79 H   (9-20)  mg/dL


 


Creatinine   1.45 H   (0.66-1.25)  mg/dL


 


Glucose   133 H   (74-99)  mg/dL


 


POC Glucose (mg/dL)    144 H  (75-99)  mg/dL


 


Calcium   7.9 L   (8.4-10.2)  mg/dL














  06/07/18 06/07/18 Range/Units





  16:51 21:01 


 


RBC    (4.30-5.90)  m/uL


 


Hgb    (13.0-17.5)  gm/dL


 


Hct    (39.0-53.0)  %


 


RDW    (11.5-15.5)  %


 


Lymphocytes #    (1.0-4.8)  k/uL


 


Sodium    (137-145)  mmol/L


 


Potassium    (3.5-5.1)  mmol/L


 


Chloride    ()  mmol/L


 


Carbon Dioxide    (22-30)  mmol/L


 


BUN    (9-20)  mg/dL


 


Creatinine    (0.66-1.25)  mg/dL


 


Glucose    (74-99)  mg/dL


 


POC Glucose (mg/dL)  64 L  59 L  (75-99)  mg/dL


 


Calcium    (8.4-10.2)  mg/dL








 Microbiology - Last 24 Hours (Table)











 06/04/18 17:27 Blood Culture - Preliminary





 Blood    No Growth after 72 hours














Assessment and Plan


Assessment: 





Shortness of breath secondary to acute COPD exacerbation and also acute 

pulmonary embolism


Acute on chronic hypoxic respiratory failure secondary to above


Elevated d-dimer with moderate probability for PE on VQ scan


Unlikely acute CHF with normal EF and also BNP is not elevated


Acute on chronic kidney disease stage III


Hypertension


Hyperlipidemia


Diabetes type 2


GERD


Prostate cancer status post external and internal radiation


History of MI


CODE STATUS is DO NOT RESUSCITATE/DO NOT INTUBATE





Plan:


Patient was continued on heparin drip and changed to oral anticoagulation.  

Continued on Lasix and aspirin, atenolol..  Patient will be continued on 

breathing treatments and IV steroids.  Pulmonary and cardiology is following.  

Further recommendations based on clinical course.  Prognosis is guarded with 

multiple medical problems and comorbid conditions.


Time with Patient: Greater than 30